# Patient Record
Sex: FEMALE | Race: WHITE | NOT HISPANIC OR LATINO | Employment: PART TIME | URBAN - METROPOLITAN AREA
[De-identification: names, ages, dates, MRNs, and addresses within clinical notes are randomized per-mention and may not be internally consistent; named-entity substitution may affect disease eponyms.]

---

## 2017-01-19 ENCOUNTER — TRANSCRIBE ORDERS (OUTPATIENT)
Dept: PHYSICAL THERAPY | Facility: HOSPITAL | Age: 57
End: 2017-01-19

## 2017-01-19 ENCOUNTER — HOSPITAL ENCOUNTER (OUTPATIENT)
Dept: PHYSICAL THERAPY | Facility: HOSPITAL | Age: 57
Setting detail: THERAPIES SERIES
Discharge: HOME | End: 2017-01-19
Attending: PHYSICAL MEDICINE & REHABILITATION

## 2017-01-19 DIAGNOSIS — M79.18 MYOFASCIAL MUSCLE PAIN: ICD-10-CM

## 2017-01-19 DIAGNOSIS — M54.5 LOW BACK PAIN, UNSPECIFIED BACK PAIN LATERALITY, UNSPECIFIED CHRONICITY, WITH SCIATICA PRESENCE UNSPECIFIED: Primary | ICD-10-CM

## 2017-01-19 DIAGNOSIS — M51.36 DDD (DEGENERATIVE DISC DISEASE), LUMBAR: ICD-10-CM

## 2017-01-19 DIAGNOSIS — M54.16 LUMBAR RADICULOPATHY: Primary | ICD-10-CM

## 2017-01-19 DIAGNOSIS — M79.7 FIBROMYALGIA: ICD-10-CM

## 2017-01-19 PROCEDURE — 97162 PT EVAL MOD COMPLEX 30 MIN: CPT | Performed by: PHYSICAL THERAPIST

## 2017-01-19 PROCEDURE — G8978 MOBILITY CURRENT STATUS: HCPCS | Performed by: PHYSICAL THERAPIST

## 2017-01-19 PROCEDURE — 97110 THERAPEUTIC EXERCISES: CPT | Performed by: PHYSICAL THERAPIST

## 2017-01-19 PROCEDURE — G8979 MOBILITY GOAL STATUS: HCPCS | Performed by: PHYSICAL THERAPIST

## 2017-01-20 NOTE — PROGRESS NOTES
Outpatient Physical Therapy Ortho Initial Evaluation  [facility]     Patient Name: Noni Mayers  : 1960  MRN: [id number]  Today's Date: 2017      Visit Date: 2017    There is no problem list on file for this patient.       Past Medical History   Diagnosis Date   • Arthritis    • Asthma    • Diabetes mellitus    • Fibromyalgia    • Injury of back    • Myofascial muscle pain    • Restless leg syndrome         Past Surgical History   Procedure Laterality Date   • Hysterectomy     • Bladder surgery     • Cholecystectomy     • Colonoscopy     • Oophorectomy         Visit Dx:     ICD-10-CM ICD-9-CM   1. Lumbar radiculopathy M54.16 724.4   2. DDD (degenerative disc disease), lumbar M51.36 722.52   3. Fibromyalgia M79.7 729.1   4. Myofascial muscle pain M79.1 729.1             Patient History       17 1500          History    Chief Complaint Pain;Muscle tenderness  -DM      Type of Pain Back pain;Lower Extremity / Leg  -DM      Date Current Problem(s) Began --   Chronic  -DM      Brief Description of Current Complaint Long history of back pain dating to a fall in  with subsequent surgery in .  She was diagnosed with FMS  5-6 years ago.  She has suffered with back pain with radicular symptoms since that time.  She worsens in the winter months.  She has had aquatic and land PT but was several years ago.    -DM      Previous treatment for THIS PROBLEM Chiropractor;Rehabilitation;Injections;Massage;Surgery;Medication  -DM      Patient/Caregiver Goals Relieve pain;Improve mobility;Improve strength;Know what to do to help the symptoms  -DM      Hand Dominance right-handed  -DM      Occupation/sports/leisure activities Part time care-giver/  -DM      Patient seeing anyone else for problem(s)? Yes  -DM      Pain     Pain Location Back  -DM      Pain at Present 6  -DM      Pain at Best 5  -DM      Pain at Worst 8  -DM      Pain Frequency Constant/continuous   varys in intensity   -DM      Pain Description Aching;Burning;Dull;Sore;Tender;Tightness  -DM      What Performance Factors Make the Current Problem(s) WORSE? All activities, prolonged standing, sitting, walking, bending, cold weather  -DM      What Performance Factors Make the Current Problem(s) BETTER? pain meds, sidelying, change of position  -DM      Tolerance Time- Standing 30 min  -DM      Tolerance Time- Sitting 10 min  -DM      Tolerance Time- Walking 1/4 mile  -DM      Is your sleep disturbed? Yes  -DM      Is medication used to assist with sleep? Yes   lyrica has helped reduce awakenings  -DM      Total hours of sleep per night 5-6  -DM      What position do you sleep in? Right sidelying;Left sidelying  -DM      Difficulties at work? sitting, standing, walking  -DM      Difficulties with ADL's? difficult  -DM      Difficulties with recreational activities? limited in recreational activities.   -DM      Fall Risk Assessment    Any falls in the past year: No  -DM      Daily Activities    Primary Language English  -DM      Are you able to read Yes  -DM      Are you able to write Yes  -DM      How does patient learn best? Listening;Reading;Demonstration;Pictures/Video  -DM      Teaching needs identified Home Exercise Program;Management of Condition  -DM      Does patient have problems with the following? Anxiety  -DM      Barriers to learning None  -DM      Pt Participated in POC and Goals Yes  -DM      Safety    Are you being hurt, hit, or frightened by anyone at home or in your life? No  -DM      Are you being neglected by a caregiver No  -DM        User Key  (r) = Recorded By, (t) = Taken By, (c) = Cosigned By    Initials Name Provider Type    DM Tete Lizama, PT Physical Therapist                PT Ortho       01/19/17 1400    Posture/Observations    Posture/Observations Comments FH,  rounded shoulders, slight increase in kyphosis, decreased lordosis  -DM    Trunk    Flexion AROM --   Decreased 75%  -DM    Extension AROM --    decreased 75%  -DM    Left Lateral Flexion AROM --   Decreased 50%  -DM    Right Lateral Flexion AROM --   Decreased 50%  -DM    Trunk    Trunk Flexion Gross Movement (3-/5) fair minus  -DM    Trunk Extension Gross Movement (3-/5) fair minus  -DM    Left Hip    Hip Flexion Gross Movement (3+/5) fair plus  -DM    Hip Extension Gross Movement (3/5) fair  -DM    Hip ABduction Gross Movement (4-/5) good minus  -DM    Right Hip    Hip Flexion Gross Movement (3+/5) fair plus  -DM    Hip Extension Gross Movement (3/5) fair  -DM    Hip ABduction Gross Movement (4-/5) good minus  -DM    Left Knee    Knee Extension Gross Movement (4-/5) good minus  -DM    Knee Flexion Gross Movement (4-/5) good minus  -DM    Right Knee    Knee Extension Gross Movement (4-/5) good minus  -DM    Knee Flexion Gross Movement (4-/5) good minus  -DM    Left Ankle/Foot    Ankle PF Gross Movement (4-/5) good minus  -DM    Ankle Dorsiflexion Gross Movement (4-/5) good minus  -DM    Right Ankle/Foot    Ankle PF Gross Movement (4-/5) good minus  -DM    Ankle Dorsiflexion Gross Movement (4-/5) good minus  -DM    Flexibility    Flexibility Tested? Lower Extremity  -DM    Lower Extremity Flexibility    Hamstrings Mildly limited  -DM    Hip External Rotators WNL  -DM    Hip Internal Rotators WNL  -DM    Quadratus Lumborum Moderately limited  -DM      User Key  (r) = Recorded By, (t) = Taken By, (c) = Cosigned By    Initials Name Provider Type    DM Tete Lizama PT Physical Therapist                            Therapy Education       01/20/17 0755    Therapy Education    Given HEP  -DM    Program New  -DM    How Provided Verbal;Demonstration;Written  -DM    Provided to Patient  -DM    Level of Understanding Teach back education performed;Verbalized;Demonstrated  -DM      User Key  (r) = Recorded By, (t) = Taken By, (c) = Cosigned By    Initials Name Provider Type    JENNIE Lizama PT Physical Therapist                PT OP Goals       01/20/17 0700           PT Short Term Goals    STG Date to Achieve 02/17/17  -DM      STG 1 Pt will be independent with initial ROM and flexibility program for improved mobility with ADL's  -DM      STG 1 Progress New  -DM      STG 2 Pt will demonstrate improved lumbar ROM by 25%.  -DM      STG 2 Progress New  -DM      STG 3 Pt will report decreased pain to 6/10 at worst with ADL's  -DM      STG 3 Progress New  -DM      STG 4 Pt will demonstrate improved 5x sit to stand from 16.92 seconds to 12 seconds or less demonstrating improved proximal strength.   -DM      STG 4 Progress New  -DM      Long Term Goals    LTG Date to Achieve 03/05/17  -DM      LTG 1 Pt will be independent with lumbar stabilization program for ease with all functional tasks.  -DM      LTG 1 Progress New  -DM      LTG 2 Pt will tolerate standing for longer than 30 minutes without exacerbation of symptoms  -DM      LTG 2 Progress New  -DM      LTG 3 Pt will demonstrate increased trunk and LE strength by 1/2 to 1 grade for stability with functional tasks.  -DM      LTG 3 Progress New  -DM      LTG 4 Pt will report improved function via Oswestry from 70% to 40% disability for improved quality of life.   -DM      LTG 4 Progress New  -DM      Time Calculation    PT Goal Re-Cert Due Date 02/17/17  -DM        User Key  (r) = Recorded By, (t) = Taken By, (c) = Cosigned By    Initials Name Provider Type    JENNIE Lizama, PT Physical Therapist                PT Assessment/Plan       01/20/17 0749          PT Assessment    Functional Limitations Limitation in home management;Limitations in community activities;Limitations in functional capacity and performance;Performance in self-care ADL;Performance in leisure activities;Performance in work activities  -DM      Impairments Pain;Muscle strength;Range of motion;Posture;Poor body mechanics  -DM      Assessment Comments Noni Mayers is a 57 yo female diagnosed with lumbar radiculopathy, lumbar DDD, fibromyalgia,  myofascial muscle pain and is s/p lumbar fusion.  She presents with posture, decreased lumbar ROM, decreased LE and trunk strength more so proximally, diffculty with transitional motions as well as static positions and worsening pain.  Oswestry score was 70% disability.  She will benefit from skilled physical therapy intervention for improved lumbar and proximal stabilization, improved tolerance with static and transitional motions with decreased pain for improved quality of life  -DM      Please refer to paper survey for additional self-reported information Yes  -DM      Rehab Potential Good  -DM      Patient/caregiver participated in establishment of treatment plan and goals Yes  -DM      Patient would benefit from skilled therapy intervention Yes  -DM      PT Plan    PT Frequency 2x/week;3x/week  -DM      Predicted Duration of Therapy Intervention (days/wks) 6 weeks  -DM      Planned CPT's? PT EVAL: 17013;PT NEUROMUSC RE-EDUCATION EA 15 MIN: 26730;PT HOT OR COLD PACK TREAT MCARE;PT ELECTRICAL STIM UNATTEND: ;PT ULTRASOUND EA 15 MIN: 42968;PT THER PROC EA 15 MIN: 44444;PT MANUAL THERAPY EA 15 MIN: 95551  -DM      Physical Therapy Interventions (Optional Details) home exercise program;postural re-education;patient/family education;neuromuscular re-education;modalities;manual therapy techniques;lumbar stabilization;ROM (Range of Motion);strengthening;stretching;swiss ball techniques  -DM      PT Plan Comments Review HEP and progress in ROM and stabilization.  Modalities PRN  -DM        User Key  (r) = Recorded By, (t) = Taken By, (c) = Cosigned By    Initials Name Provider Type    DM Tete Lizama, PT Physical Therapist                  Exercises       01/20/17 0700          Exercise 1    Exercise Name 1 Instructed in LTR, bridges, supine marching with TA, hamstring stretch with strap as outlined on flow sheet   -DM      Cueing 1 Verbal;Tactile  -DM        User Key  (r) = Recorded By, (t) = Taken By, (c) =  Cosigned By    Initials Name Provider Type    DM Tete Lizama, PT Physical Therapist                              Outcome Measures       01/19/17 1400          5 Times Sit to Stand    5 Times Sit to Stand (seconds) 16.92 seconds  -DM      5 Times Sit to Stand Comments used arm rest to push off  -DM      Modified Oswestry    Modified Oswestry Score/Comments 70% disability  -DM      Functional Assessment    Outcome Measure Options 5x Sit to Stand;Modifed Owestry  -DM        User Key  (r) = Recorded By, (t) = Taken By, (c) = Cosigned By    Initials Name Provider Type    JENNIE Lizama, PT Physical Therapist            Time Calculation:   Start Time: 1430  Stop Time: 1515  Time Calculation (min): 45 min     Therapy Charges for Today     Code Description Service Date Service Provider Modifiers Qty    65248349460 HC PT MOBILITY CURRENT 1/19/2017 Tete Lizama, PT GP, CL 1    27878700866 HC PT MOBILITY PROJECTED 1/19/2017 Tete Lizama, PT GP, CK 1    90666181569 HC PT EVAL MOD COMPLEXITY 2 1/19/2017 Tete Lizama, PT GP 1    89794382963 HC PT THER PROC EA 15 MIN 1/19/2017 Tete Lizama, PT GP 1          PT G-Codes  Outcome Measure Options: 5x Sit to Stand, Modifed Owestry  Score: 16.92 seconds; 70% disability  Functional Limitation: Mobility: Walking and moving around  Mobility: Walking and Moving Around Current Status (): At least 60 percent but less than 80 percent impaired, limited or restricted  Mobility: Walking and Moving Around Goal Status (): At least 40 percent but less than 60 percent impaired, limited or restricted         Tete Lizama, PT  1/20/2017

## 2017-01-20 NOTE — PROGRESS NOTES
Allergies   Allergen Reactions   • Penicillins Hives   • Tetracyclines & Related Nausea And Vomiting       Allergies have been reviewed.    Jodi Morse, [facility]

## 2017-01-24 ENCOUNTER — APPOINTMENT (OUTPATIENT)
Dept: PHYSICAL THERAPY | Facility: HOSPITAL | Age: 57
End: 2017-01-24

## 2017-01-26 ENCOUNTER — APPOINTMENT (OUTPATIENT)
Dept: PHYSICAL THERAPY | Facility: HOSPITAL | Age: 57
End: 2017-01-26

## 2017-01-31 ENCOUNTER — HOSPITAL ENCOUNTER (OUTPATIENT)
Dept: PHYSICAL THERAPY | Facility: HOSPITAL | Age: 57
Setting detail: THERAPIES SERIES
End: 2017-01-31

## 2017-02-03 ENCOUNTER — APPOINTMENT (OUTPATIENT)
Dept: PHYSICAL THERAPY | Facility: HOSPITAL | Age: 57
End: 2017-02-03

## 2017-02-07 ENCOUNTER — HOSPITAL ENCOUNTER (OUTPATIENT)
Dept: PHYSICAL THERAPY | Facility: HOSPITAL | Age: 57
Setting detail: THERAPIES SERIES
Discharge: HOME | End: 2017-02-07

## 2017-02-07 DIAGNOSIS — M54.16 LUMBAR RADICULOPATHY: Primary | ICD-10-CM

## 2017-02-07 DIAGNOSIS — M51.36 DDD (DEGENERATIVE DISC DISEASE), LUMBAR: ICD-10-CM

## 2017-02-07 DIAGNOSIS — M79.7 FIBROMYALGIA: ICD-10-CM

## 2017-02-07 DIAGNOSIS — M79.18 MYOFASCIAL MUSCLE PAIN: ICD-10-CM

## 2017-02-07 PROCEDURE — 97110 THERAPEUTIC EXERCISES: CPT | Performed by: PHYSICAL THERAPIST

## 2017-02-07 PROCEDURE — G0283 ELEC STIM OTHER THAN WOUND: HCPCS | Performed by: PHYSICAL THERAPIST

## 2017-02-07 NOTE — PROGRESS NOTES
Outpatient Physical Therapy Ortho Treatment Note  [facility]     Patient Name: Noni Mayers  : 1960  MRN: [id number]  Today's Date: 2017      Visit Date: 2017    Visit Dx:    ICD-10-CM ICD-9-CM   1. Lumbar radiculopathy M54.16 724.4   2. DDD (degenerative disc disease), lumbar M51.36 722.52   3. Fibromyalgia M79.7 729.1   4. Myofascial muscle pain M79.1 729.1       There is no problem list on file for this patient.       Past Medical History   Diagnosis Date   • Arthritis    • Asthma    • Diabetes mellitus    • Fibromyalgia    • Injury of back    • Myofascial muscle pain    • Restless leg syndrome         Past Surgical History   Procedure Laterality Date   • Hysterectomy     • Bladder surgery     • Cholecystectomy     • Colonoscopy     • Oophorectomy                               PT Assessment/Plan       17 0939          PT Assessment    Assessment Comments Noni Mayers has only had 2 appts counting today due to severe GI illness.  She has not been able to do any of her exercise and is has not had any decrease in pain.  Even with pain killers, she reports her pain at 7/10.  She was able to be progressed in exercise today but did have a slight increase in pain with exercises.   -DM      PT Plan    PT Plan Comments Cont with current exericse program with focus on strengthening and stabilization.   -DM        User Key  (r) = Recorded By, (t) = Taken By, (c) = Cosigned By    Initials Name Provider Type    JENNIE Lizama, PT Physical Therapist                Modalities       17 0800          Moist Heat    MH Applied Yes  -DM      Location LB  -DM      Rx Minutes 15 mins  -DM      ELECTRICAL STIMULATION    Attended/Unattended Unattended  -DM      Stimulation Type IFC  -DM      Max mAmp 10.5  -DM      Location/Electrode Placement/Other crossed pattern over lumbar paraspinals.   -DM      Rx Minutes 15 mins  -DM        User Key  (r) = Recorded By, (t) = Taken By, (c) = Cosigned  By    Initials Name Provider Type    JENNIE Lizama, PT Physical Therapist                Exercises       02/07/17 0800          Subjective Comments    Subjective Comments Took pain meds this morning.  Has been very ill and to the ER twice and struggling with high blood sugar ( over 320).   -DM      Subjective Pain    Able to rate subjective pain? yes  -DM      Pre-Treatment Pain Level 7  -DM      Post-Treatment Pain Level 8  -DM      Subjective Pain Comment Without pain meds her pain is up to 9-10/10.   -DM      Exercise 1    Exercise Name 1 Performed previous exercise program with cues for technique.  -DM      Cueing 1 Verbal;Tactile  -DM      Exercise 2    Exercise Name 2 Progressed in strengthening as outlined on flow sheet.  -DM      Cueing 2 Verbal;Tactile;Demo  -DM        User Key  (r) = Recorded By, (t) = Taken By, (c) = Cosigned By    Initials Name Provider Type    JENNIE Lizama, PT Physical Therapist                               PT OP Goals       02/07/17 0800          PT Short Term Goals    STG 1 Pt will be independent with initial ROM and flexibility program for improved mobility with ADL's  -DM      STG 1 Progress Ongoing  -DM      STG 2 Pt will demonstrate improved lumbar ROM by 25%.  -DM      STG 2 Progress Ongoing  -DM      STG 3 Pt will report decreased pain to 6/10 at worst with ADL's  -DM      STG 3 Progress Ongoing  -DM      STG 4 Pt will demonstrate improved 5x sit to stand from 16.92 seconds to 12 seconds or less demonstrating improved proximal strength.   -DM      STG 4 Progress Ongoing  -DM      Long Term Goals    LTG 1 Pt will be independent with lumbar stabilization program for ease with all functional tasks.  -DM      LTG 1 Progress Ongoing  -DM      LTG 2 Pt will tolerate standing for longer than 30 minutes without exacerbation of symptoms  -DM      LTG 2 Progress Ongoing  -DM      LTG 3 Pt will demonstrate increased trunk and LE strength by 1/2 to 1 grade for stability with  functional tasks.  -DM      LTG 3 Progress Ongoing  -DM      LTG 4 Pt will report improved function via Oswestry from 70% to 40% disability for improved quality of life.   -DM      LTG 4 Progress Ongoing  -DM        User Key  (r) = Recorded By, (t) = Taken By, (c) = Cosigned By    Initials Name Provider Type    JENNIE Lizama, PT Physical Therapist                    Time Calculation:   Start Time: 0815  Stop Time: 0900  Time Calculation (min): 45 min    Therapy Charges for Today     Code Description Service Date Service Provider Modifiers Qty    98972600423 HC PT THER PROC EA 15 MIN 2/7/2017 Tete Lizama, PT GP 2    17209563921 HC PT HOT OR COLD PACK TREAT MCARE 2/7/2017 Tete Lizama, PT GP 1    64475966190 HC ELECTRICAL STIM UNATTENDED 2/7/2017 Tete Lizama, PT  1                    Tete Lizama, PT  2/7/2017

## 2017-02-10 ENCOUNTER — APPOINTMENT (OUTPATIENT)
Dept: PHYSICAL THERAPY | Facility: HOSPITAL | Age: 57
End: 2017-02-10

## 2017-02-14 ENCOUNTER — HOSPITAL ENCOUNTER (OUTPATIENT)
Dept: PHYSICAL THERAPY | Facility: HOSPITAL | Age: 57
Setting detail: THERAPIES SERIES
Discharge: HOME | End: 2017-02-14

## 2017-02-14 DIAGNOSIS — M79.18 MYOFASCIAL MUSCLE PAIN: ICD-10-CM

## 2017-02-14 DIAGNOSIS — M51.36 DDD (DEGENERATIVE DISC DISEASE), LUMBAR: ICD-10-CM

## 2017-02-14 DIAGNOSIS — M54.16 LUMBAR RADICULOPATHY: Primary | ICD-10-CM

## 2017-02-14 DIAGNOSIS — M79.7 FIBROMYALGIA: ICD-10-CM

## 2017-02-14 PROCEDURE — G0283 ELEC STIM OTHER THAN WOUND: HCPCS | Performed by: PHYSICAL THERAPIST

## 2017-02-14 PROCEDURE — 97110 THERAPEUTIC EXERCISES: CPT | Performed by: PHYSICAL THERAPIST

## 2017-02-14 NOTE — PROGRESS NOTES
Outpatient Physical Therapy Ortho Treatment Note  [facility]     Patient Name: Noni Mayers  : 1960  MRN: [id number]  Today's Date: 2017      Visit Date: 2017    Visit Dx:    ICD-10-CM ICD-9-CM   1. Lumbar radiculopathy M54.16 724.4   2. DDD (degenerative disc disease), lumbar M51.36 722.52   3. Fibromyalgia M79.7 729.1   4. Myofascial muscle pain M79.1 729.1       There is no problem list on file for this patient.       Past Medical History   Diagnosis Date   • Arthritis    • Asthma    • Diabetes mellitus    • Fibromyalgia    • Injury of back    • Myofascial muscle pain    • Restless leg syndrome         Past Surgical History   Procedure Laterality Date   • Hysterectomy     • Bladder surgery     • Cholecystectomy     • Colonoscopy     • Oophorectomy                               PT Assessment/Plan       17 0804 17 0939       PT Assessment    Assessment Comments She returns today with same level of pain.  She does have increased LBP on opposing side when performing closed chain and hip flexion exercises. She is on her 2nd month of Lyrica and is having less pain..   -DM Noni Mayers has only had 2 appts counting today due to severe GI illness.  She has not been able to do any of her exercise and is has not had any decrease in pain.  Even with pain killers, she reports her pain at 7/10.  She was able to be progressed in exercise today but did have a slight increase in pain with exercises.   -DM     PT Plan    PT Plan Comments cont with exercise program with focus on stabilization.   -DM Cont with current exericse program with focus on strengthening and stabilization.   -DM       User Key  (r) = Recorded By, (t) = Taken By, (c) = Cosigned By    Initials Name Provider Type    JENNIE Lizama, PT Physical Therapist                Modalities       17 0700          Moist Heat    MH Applied Yes  -DM      Location LB  -DM      Rx Minutes 15 mins  -DM      ELECTRICAL  STIMULATION    Attended/Unattended Unattended  -DM      Stimulation Type IFC  -DM      Max mAmp 4.5  -DM      Location/Electrode Placement/Other crossed pattern over lumbar paraspinals.   -DM      Rx Minutes 15 mins  -DM        User Key  (r) = Recorded By, (t) = Taken By, (c) = Cosigned By    Initials Name Provider Type    JENNIE Lizama, PT Physical Therapist                Exercises       02/14/17 0700          Subjective Comments    Subjective Comments A little stiff this morning due to chilllier weather.   -DM      Subjective Pain    Able to rate subjective pain? yes  -DM      Pre-Treatment Pain Level 7  -DM      Exercise 1    Exercise Name 1 Completed exercises as outlined on flow sheet.  Began with Nustep.  -DM      Cueing 1 Verbal;Tactile;Demo  -DM        User Key  (r) = Recorded By, (t) = Taken By, (c) = Cosigned By    Initials Name Provider Type    JENNIE Lizama, PT Physical Therapist                               PT OP Goals       02/07/17 0800          PT Short Term Goals    STG 1 Pt will be independent with initial ROM and flexibility program for improved mobility with ADL's  -DM      STG 1 Progress Ongoing  -DM      STG 2 Pt will demonstrate improved lumbar ROM by 25%.  -DM      STG 2 Progress Ongoing  -DM      STG 3 Pt will report decreased pain to 6/10 at worst with ADL's  -DM      STG 3 Progress Ongoing  -DM      STG 4 Pt will demonstrate improved 5x sit to stand from 16.92 seconds to 12 seconds or less demonstrating improved proximal strength.   -DM      STG 4 Progress Ongoing  -DM      Long Term Goals    LTG 1 Pt will be independent with lumbar stabilization program for ease with all functional tasks.  -DM      LTG 1 Progress Ongoing  -DM      LTG 2 Pt will tolerate standing for longer than 30 minutes without exacerbation of symptoms  -DM      LTG 2 Progress Ongoing  -DM      LTG 3 Pt will demonstrate increased trunk and LE strength by 1/2 to 1 grade for stability with functional tasks.  -DM       LTG 3 Progress Ongoing  -DM      LTG 4 Pt will report improved function via Oswestry from 70% to 40% disability for improved quality of life.   -DM      LTG 4 Progress Ongoing  -DM        User Key  (r) = Recorded By, (t) = Taken By, (c) = Cosigned By    Initials Name Provider Type    DM Tete Lizama, PT Physical Therapist                    Time Calculation:   Start Time: 0730  Stop Time: 0815  Time Calculation (min): 45 min    Therapy Charges for Today     Code Description Service Date Service Provider Modifiers Qty    02883065603 HC PT THER PROC EA 15 MIN 2/14/2017 Tete Lizama, PT GP 2    19993515449 HC PT HOT OR COLD PACK TREAT MCARE 2/14/2017 Tete Lizama, PT GP 1    54595361250 HC ELECTRICAL STIM UNATTENDED 2/14/2017 Tete Lizama, PT  1                    Tete Lizama, PT  2/14/2017

## 2017-02-17 ENCOUNTER — APPOINTMENT (OUTPATIENT)
Dept: PHYSICAL THERAPY | Facility: HOSPITAL | Age: 57
End: 2017-02-17

## 2017-02-21 ENCOUNTER — APPOINTMENT (OUTPATIENT)
Dept: PHYSICAL THERAPY | Facility: HOSPITAL | Age: 57
End: 2017-02-21

## 2017-02-24 ENCOUNTER — APPOINTMENT (OUTPATIENT)
Dept: PHYSICAL THERAPY | Facility: HOSPITAL | Age: 57
End: 2017-02-24

## 2017-03-02 ENCOUNTER — HOSPITAL ENCOUNTER (OUTPATIENT)
Dept: PHYSICAL THERAPY | Facility: HOSPITAL | Age: 57
Setting detail: THERAPIES SERIES
Discharge: HOME | End: 2017-03-02

## 2017-03-02 DIAGNOSIS — M51.36 DDD (DEGENERATIVE DISC DISEASE), LUMBAR: ICD-10-CM

## 2017-03-02 DIAGNOSIS — M79.7 FIBROMYALGIA: ICD-10-CM

## 2017-03-02 DIAGNOSIS — M79.18 MYOFASCIAL MUSCLE PAIN: ICD-10-CM

## 2017-03-02 DIAGNOSIS — M54.16 LUMBAR RADICULOPATHY: Primary | ICD-10-CM

## 2017-03-02 PROCEDURE — 97110 THERAPEUTIC EXERCISES: CPT | Performed by: PHYSICAL THERAPIST

## 2017-03-02 PROCEDURE — G0283 ELEC STIM OTHER THAN WOUND: HCPCS | Performed by: PHYSICAL THERAPIST

## 2017-03-02 NOTE — PROGRESS NOTES
Outpatient Physical Therapy Ortho Re-Assessment  [facility]     Patient Name: Noni Mayers  : 1960  MRN: [id number]  Today's Date: 3/2/2017      Visit Date: 2017    There is no problem list on file for this patient.       Past Medical History   Diagnosis Date   • Arthritis    • Asthma    • Diabetes mellitus    • Fibromyalgia    • Injury of back    • Myofascial muscle pain    • Restless leg syndrome         Past Surgical History   Procedure Laterality Date   • Hysterectomy     • Bladder surgery     • Cholecystectomy     • Colonoscopy     • Oophorectomy         Visit Dx:     ICD-10-CM ICD-9-CM   1. Lumbar radiculopathy M54.16 724.4   2. DDD (degenerative disc disease), lumbar M51.36 722.52   3. Fibromyalgia M79.7 729.1   4. Myofascial muscle pain M79.1 729.1                 PT Ortho       17 0900    Subjective Comments    Subjective Comments Not having to take her pain meds until 10-11, used to wake up at 5 am to take pain meds.   -DM    Subjective Pain    Able to rate subjective pain? yes  -DM    Pre-Treatment Pain Level 7  -DM    Trunk    Flexion AROM --   decreased 25%  -DM    Extension AROM --   decreased 50%  -DM    Left Lateral Flexion AROM --   decreased 50%  -DM    Right Lateral Flexion AROM --   decreased 50%  -DM    Trunk    Trunk Flexion Gross Movement (3/5) fair  -DM    Trunk Extension Gross Movement (3/5) fair  -DM    Left Hip    Hip Flexion Gross Movement (3+/5) fair plus  -DM    Hip Extension Gross Movement (3/5) fair  -DM    Hip ABduction Gross Movement (4-/5) good minus  -DM    Right Hip    Hip Flexion Gross Movement (3+/5) fair plus  -DM    Hip Extension Gross Movement (3/5) fair  -DM    Hip ABduction Gross Movement (4-/5) good minus  -DM    Left Knee    Knee Extension Gross Movement (4-/5) good minus  -DM    Knee Flexion Gross Movement (4-/5) good minus  -DM    Right Knee    Knee Extension Gross Movement (4-/5) good minus  -DM    Knee Flexion Gross Movement (4-/5) good  minus  -DM    Left Ankle/Foot    Ankle PF Gross Movement (4-/5) good minus  -DM    Ankle Dorsiflexion Gross Movement (4-/5) good minus  -DM    Right Ankle/Foot    Ankle PF Gross Movement (4-/5) good minus  -DM    Ankle Dorsiflexion Gross Movement (4-/5) good minus  -DM    Lower Extremity Flexibility    Hamstrings Mildly limited  -DM    Hip External Rotators WNL  -DM    Hip Internal Rotators WNL  -DM    Quadratus Lumborum Moderately limited  -DM      User Key  (r) = Recorded By, (t) = Taken By, (c) = Cosigned By    Initials Name Provider Type    JENNIE Lizama, PT Physical Therapist                                PT OP Goals       03/02/17 1000       PT Short Term Goals    STG 1 Pt will be independent with initial ROM and flexibility program for improved mobility with ADL's  -DM     STG 1 Progress Progressing  -DM     STG 2 Pt will demonstrate improved lumbar ROM by 25%.  -DM     STG 2 Progress Partially Met  -DM     STG 2 Progress Comments For flexion and extension  -DM     STG 3 Pt will report decreased pain to 6/10 at worst with ADL's  -DM     STG 3 Progress Met  -DM     STG 3 Progress Comments At worst is 6/10.  -DM     STG 4 Pt will demonstrate improved 5x sit to stand from 16.92 seconds to 12 seconds or less demonstrating improved proximal strength.   -DM     STG 4 Progress Ongoing  -DM     Long Term Goals    LTG 1 Pt will be independent with lumbar stabilization program for ease with all functional tasks.  -DM     LTG 1 Progress Ongoing  -DM     LTG 2 Pt will tolerate standing for longer than 30 minutes without exacerbation of symptoms  -DM     LTG 2 Progress Ongoing  -DM     LTG 3 Pt will demonstrate increased trunk and LE strength by 1/2 to 1 grade for stability with functional tasks.  -DM     LTG 3 Progress Ongoing  -DM     LTG 4 Pt will report improved function via Oswestry from 70% to 40% disability for improved quality of life.   -DM     LTG 4 Progress Ongoing  -DM       User Key  (r) = Recorded By,  (t) = Taken By, (c) = Cosigned By    Initials Name Provider Type    JENNIE Lizama, PT Physical Therapist                PT Assessment/Plan       03/02/17 1035       PT Assessment    Assessment Comments Kam Mayers has completed 4 sessions, including initial evaluation, since 1/19/17.  She has had various illnesses that have kept her from attending PT on a regular basis.  She is feeling better overall and not waking up requiring pain medication.  She will take pain medications at 10-11 am now.  Her trunk ROM is slightly improved but strength is not improved as of yet.  She will benefit from continued skilled physical therapy on a consistent basis in order to demonstrate improvement in strength.    -DM     Please refer to paper survey for additional self-reported information No  -DM     Rehab Potential Good  -DM     Patient/caregiver participated in establishment of treatment plan and goals Yes  -DM     Patient would benefit from skilled therapy intervention Yes  -DM     PT Plan    PT Frequency 2x/week  -DM     Predicted Duration of Therapy Intervention (days/wks) 4 weeks  -DM     PT Plan Comments Progress in core and LE strengthening/ stabilization.  -DM       User Key  (r) = Recorded By, (t) = Taken By, (c) = Cosigned By    Initials Name Provider Type    JENNIE Lizama, PT Physical Therapist                Modalities       03/02/17 0900          Moist Heat    MH Applied Yes  -DM      Location LB  -DM      Rx Minutes 15 mins  -DM      ELECTRICAL STIMULATION    Attended/Unattended Unattended  -DM      Stimulation Type IFC  -DM      Max mAmp 5.4  -DM      Location/Electrode Placement/Other crossed pattern over lumbar paraspinals.   -DM      Rx Minutes 15 mins  -DM        User Key  (r) = Recorded By, (t) = Taken By, (c) = Cosigned By    Initials Name Provider Type    JENNIE Lizama, PT Physical Therapist              Exercises       03/02/17 0900          Subjective Comments    Subjective Comments Not having  to take her pain meds until 10-11, used to wake up at 5 am to take pain meds.   -DM      Subjective Pain    Able to rate subjective pain? yes  -DM      Pre-Treatment Pain Level 7  -DM      Exercise 1    Exercise Name 1 Completed exercises as outlined on flow sheet.  Began with Nustep.  -DM      Cueing 1 Verbal;Tactile;Demo  -DM        User Key  (r) = Recorded By, (t) = Taken By, (c) = Cosigned By    Initials Name Provider Type    DM Tete Lizama, PT Physical Therapist                              Time Calculation:   Start Time: 0945  Stop Time: 1030  Time Calculation (min): 45 min     Therapy Charges for Today     Code Description Service Date Service Provider Modifiers Qty    20297536748 HC PT THER PROC EA 15 MIN 3/2/2017 Tete Lizama, PT GP 2    31879532357 HC PT HOT OR COLD PACK TREAT MCARE 3/2/2017 Tete Lizama, PT GP 1    72137970137 HC ELECTRICAL STIM UNATTENDED 3/2/2017 Tete Lizama, PT  1                    eTte Lizama, PT  3/2/2017

## 2017-03-03 ENCOUNTER — APPOINTMENT (OUTPATIENT)
Dept: PHYSICAL THERAPY | Facility: HOSPITAL | Age: 57
End: 2017-03-03

## 2017-03-07 ENCOUNTER — APPOINTMENT (OUTPATIENT)
Dept: PHYSICAL THERAPY | Facility: HOSPITAL | Age: 57
End: 2017-03-07

## 2017-03-09 ENCOUNTER — APPOINTMENT (OUTPATIENT)
Dept: PHYSICAL THERAPY | Facility: HOSPITAL | Age: 57
End: 2017-03-09

## 2017-03-14 ENCOUNTER — HOSPITAL ENCOUNTER (OUTPATIENT)
Dept: PHYSICAL THERAPY | Facility: HOSPITAL | Age: 57
Setting detail: THERAPIES SERIES
Discharge: HOME | End: 2017-03-14

## 2017-03-14 DIAGNOSIS — M79.7 FIBROMYALGIA: ICD-10-CM

## 2017-03-14 DIAGNOSIS — M79.18 MYOFASCIAL MUSCLE PAIN: ICD-10-CM

## 2017-03-14 DIAGNOSIS — M54.16 LUMBAR RADICULOPATHY: Primary | ICD-10-CM

## 2017-03-14 DIAGNOSIS — M51.36 DDD (DEGENERATIVE DISC DISEASE), LUMBAR: ICD-10-CM

## 2017-03-14 PROCEDURE — 97110 THERAPEUTIC EXERCISES: CPT | Performed by: PHYSICAL THERAPIST

## 2017-03-14 PROCEDURE — G0283 ELEC STIM OTHER THAN WOUND: HCPCS | Performed by: PHYSICAL THERAPIST

## 2017-03-14 NOTE — PROGRESS NOTES
Outpatient Physical Therapy Ortho Treatment Note  [facility]     Patient Name: Noni Mayers  : 1960  MRN: [id number]  Today's Date: 3/14/2017      Visit Date: 2017    Visit Dx:    ICD-10-CM ICD-9-CM   1. Lumbar radiculopathy M54.16 724.4   2. DDD (degenerative disc disease), lumbar M51.36 722.52   3. Fibromyalgia M79.7 729.1   4. Myofascial muscle pain M79.1 729.1       There is no problem list on file for this patient.       Past Medical History   Diagnosis Date   • Arthritis    • Asthma    • Diabetes mellitus    • Fibromyalgia    • Injury of back    • Myofascial muscle pain    • Restless leg syndrome         Past Surgical History   Procedure Laterality Date   • Hysterectomy     • Bladder surgery     • Cholecystectomy     • Colonoscopy     • Oophorectomy               PT Ortho       17 0800    Subjective Comments    Subjective Comments Having some more pain yesterday and this morning.   -DM      User Key  (r) = Recorded By, (t) = Taken By, (c) = Cosigned By    Initials Name Provider Type    JENNIE Lizama, PT Physical Therapist                            PT Assessment/Plan       17 1016       PT Assessment    Assessment Comments Increased pain due to change in weather.  Her technique with exercises has improved.  She will be ready to progress in lumbar stabilization.   -DM     PT Plan    PT Plan Comments  progress in lumbar stabilization.   -DM       User Key  (r) = Recorded By, (t) = Taken By, (c) = Cosigned By    Initials Name Provider Type    JENNIE Lizama, PT Physical Therapist                Modalities       17 0800          Moist Heat    MH Applied Yes  -DM      Location LB  -DM      Rx Minutes 15 mins  -DM      ELECTRICAL STIMULATION    Attended/Unattended Unattended  -DM      Stimulation Type IFC  -DM      Max mAmp 5  -DM      Location/Electrode Placement/Other crossed pattern over lumbar paraspinals.   -DM      Rx Minutes 15 mins  -DM        User Key  (r) =  Recorded By, (t) = Taken By, (c) = Cosigned By    Initials Name Provider Type    DM Tete Lizama, PT Physical Therapist                Exercises       03/14/17 0800          Subjective Comments    Subjective Comments Having some more pain yesterday and this morning.   -DM      Subjective Pain    Able to rate subjective pain? yes  -DM      Pre-Treatment Pain Level 8  -DM      Subjective Pain Comment The Estim and heat   -DM      Exercise 1    Exercise Name 1 Completed exercises as outlined on flow sheet.  Began with Nustep.  -DM      Cueing 1 Verbal;Tactile;Demo  -DM        User Key  (r) = Recorded By, (t) = Taken By, (c) = Cosigned By    Initials Name Provider Type    JENNIE Lizama, PT Physical Therapist                                       Time Calculation:   Start Time: 0815  Stop Time: 0900  Time Calculation (min): 45 min    Therapy Charges for Today     Code Description Service Date Service Provider Modifiers Qty    23150384896 HC PT THER PROC EA 15 MIN 3/14/2017 Tete Lizama, PT GP 2    67864287555 HC PT HOT OR COLD PACK TREAT MCARE 3/14/2017 Tete Lizama, PT GP 1    74623380756 HC ELECTRICAL STIM UNATTENDED 3/14/2017 Tete Lizama, PT  1                    Tete Lizama, PT  3/14/2017

## 2017-03-16 ENCOUNTER — HOSPITAL ENCOUNTER (OUTPATIENT)
Dept: PHYSICAL THERAPY | Facility: HOSPITAL | Age: 57
Setting detail: THERAPIES SERIES
Discharge: HOME | End: 2017-03-16

## 2017-03-16 DIAGNOSIS — M79.18 MYOFASCIAL MUSCLE PAIN: ICD-10-CM

## 2017-03-16 DIAGNOSIS — M79.7 FIBROMYALGIA: ICD-10-CM

## 2017-03-16 DIAGNOSIS — M51.36 DDD (DEGENERATIVE DISC DISEASE), LUMBAR: ICD-10-CM

## 2017-03-16 DIAGNOSIS — M54.16 LUMBAR RADICULOPATHY: Primary | ICD-10-CM

## 2017-03-16 PROCEDURE — G0283 ELEC STIM OTHER THAN WOUND: HCPCS | Performed by: PHYSICAL THERAPIST

## 2017-03-16 PROCEDURE — 97110 THERAPEUTIC EXERCISES: CPT | Performed by: PHYSICAL THERAPIST

## 2017-03-16 NOTE — PROGRESS NOTES
Outpatient Physical Therapy Ortho Treatment Note  [facility]     Patient Name: Noni Mayers  : 1960  MRN: [id number]  Today's Date: 3/16/2017      Visit Date: 2017    Visit Dx:    ICD-10-CM ICD-9-CM   1. Lumbar radiculopathy M54.16 724.4   2. DDD (degenerative disc disease), lumbar M51.36 722.52   3. Fibromyalgia M79.7 729.1   4. Myofascial muscle pain M79.1 729.1       There is no problem list on file for this patient.       Past Medical History   Diagnosis Date   • Arthritis    • Asthma    • Diabetes mellitus    • Fibromyalgia    • Injury of back    • Myofascial muscle pain    • Restless leg syndrome         Past Surgical History   Procedure Laterality Date   • Hysterectomy     • Bladder surgery     • Cholecystectomy     • Colonoscopy     • Oophorectomy               PT Ortho       17 0800    Subjective Comments    Subjective Comments Having some more pain yesterday and this morning.   -DM      User Key  (r) = Recorded By, (t) = Taken By, (c) = Cosigned By    Initials Name Provider Type    JENNIE Lizama, PT Physical Therapist                            PT Assessment/Plan       17 0936       PT Assessment    Assessment Comments Decreased pain level today and improved tolerance with all exercises.   -DM     PT Plan    PT Plan Comments cont with strengthening program.   -DM       User Key  (r) = Recorded By, (t) = Taken By, (c) = Cosigned By    Initials Name Provider Type    JENNIE Lizama, PT Physical Therapist                Modalities       17 0900          Moist Heat    MH Applied Yes  -DM      Location LB  -DM      Rx Minutes 15 mins  -DM      ELECTRICAL STIMULATION    Attended/Unattended Unattended  -DM      Stimulation Type IFC  -DM      Max mAmp 4.9  -DM      Location/Electrode Placement/Other crossed pattern over lumbar paraspinals.   -DM      Rx Minutes 15 mins  -DM        User Key  (r) = Recorded By, (t) = Taken By, (c) = Cosigned By    Initials Name Provider  Type    DM Tete Lizama, PT Physical Therapist                Exercises       03/16/17 0900          Subjective Comments    Subjective Comments Feeling a little better.  Still has to take hydrocodone in the mornings.  -DM      Subjective Pain    Able to rate subjective pain? yes  -DM      Pre-Treatment Pain Level 6  -DM      Post-Treatment Pain Level 5  -DM      Exercise 1    Exercise Name 1 Completed exercises as outlined on flow sheet.  Began with Nustep.  -DM      Cueing 1 Verbal;Tactile;Demo  -DM        User Key  (r) = Recorded By, (t) = Taken By, (c) = Cosigned By    Initials Name Provider Type    DM Tete Lizama, PT Physical Therapist                                       Time Calculation:   Start Time: 0900  Stop Time: 0945  Time Calculation (min): 45 min    Therapy Charges for Today     Code Description Service Date Service Provider Modifiers Qty    15879877466 HC PT THER PROC EA 15 MIN 3/16/2017 Tete Lizama, PT GP 2    50485403982 HC PT HOT OR COLD PACK TREAT MCARE 3/16/2017 Tete Lizama, PT GP 1    16372782785 HC ELECTRICAL STIM UNATTENDED 3/16/2017 Tete Lizama, PT  1                    Tete Lizama, PT  3/16/2017

## 2017-03-24 ENCOUNTER — APPOINTMENT (OUTPATIENT)
Dept: PHYSICAL THERAPY | Facility: HOSPITAL | Age: 57
End: 2017-03-24

## 2017-03-28 ENCOUNTER — HOSPITAL ENCOUNTER (OUTPATIENT)
Dept: PHYSICAL THERAPY | Facility: HOSPITAL | Age: 57
Setting detail: THERAPIES SERIES
Discharge: HOME | End: 2017-03-28

## 2017-03-28 DIAGNOSIS — M79.7 FIBROMYALGIA: ICD-10-CM

## 2017-03-28 DIAGNOSIS — M54.16 LUMBAR RADICULOPATHY: Primary | ICD-10-CM

## 2017-03-28 DIAGNOSIS — M79.18 MYOFASCIAL MUSCLE PAIN: ICD-10-CM

## 2017-03-28 DIAGNOSIS — M51.36 DDD (DEGENERATIVE DISC DISEASE), LUMBAR: ICD-10-CM

## 2017-03-28 PROCEDURE — G0283 ELEC STIM OTHER THAN WOUND: HCPCS | Performed by: PHYSICAL THERAPIST

## 2017-03-28 PROCEDURE — 97110 THERAPEUTIC EXERCISES: CPT | Performed by: PHYSICAL THERAPIST

## 2017-03-28 NOTE — PROGRESS NOTES
Outpatient Physical Therapy Ortho Treatment Note  [facility]     Patient Name: Noni Mayers  : 1960  MRN: [id number]  Today's Date: 3/28/2017      Visit Date: 2017    Visit Dx:    ICD-10-CM ICD-9-CM   1. Lumbar radiculopathy M54.16 724.4   2. DDD (degenerative disc disease), lumbar M51.36 722.52   3. Fibromyalgia M79.7 729.1   4. Myofascial muscle pain M79.1 729.1       There is no problem list on file for this patient.       Past Medical History:   Diagnosis Date   • Arthritis    • Asthma    • Diabetes mellitus    • Fibromyalgia    • Injury of back    • Myofascial muscle pain    • Restless leg syndrome         Past Surgical History:   Procedure Laterality Date   • BLADDER SURGERY     • CHOLECYSTECTOMY     • COLONOSCOPY     • HYSTERECTOMY     • OOPHORECTOMY                               PT Assessment/Plan       17 0950       PT Assessment    Assessment Comments Despite worse pain this morning, she was able to progress in strengthening without increased pain. She is moving better on and off the table and gait is smoother.  -DM     PT Plan    PT Plan Comments Assess effect of new strengthening exercises.   -DM       User Key  (r) = Recorded By, (t) = Taken By, (c) = Cosigned By    Initials Name Provider Type    JENNIE Lizama, PT Physical Therapist                Modalities       17 0800          Subjective Pain    Post-Treatment Pain Level 4  -DM      Moist Heat    MH Applied Yes  -DM      Location LB  -DM      Rx Minutes 15 mins  -DM      ELECTRICAL STIMULATION    Attended/Unattended Unattended  -DM      Stimulation Type IFC  -DM      Max mAmp 6.4  -DM      Location/Electrode Placement/Other crossed pattern over lumbar paraspinals.   -DM      Rx Minutes 15 mins  -DM        User Key  (r) = Recorded By, (t) = Taken By, (c) = Cosigned By    Initials Name Provider Type    JENNIE Lizama, PT Physical Therapist                Exercises       17 0800          Subjective  Comments    Subjective Comments Painful night. Tried 3 different beds and could not get comfortable.  Came to [facility] at 5:30 and swam in the therapy pool, used the hot tub and took a shower.  Feeling a little better now.   -DM      Subjective Pain    Able to rate subjective pain? yes  -DM      Pre-Treatment Pain Level 6  -DM      Post-Treatment Pain Level 4  -DM      Exercise 1    Exercise Name 1 Progressed to exercises on equipment today.  She had more difficulty with UE exercises compared to LE.  -DM      Cueing 1 Verbal;Tactile;Demo  -DM        User Key  (r) = Recorded By, (t) = Taken By, (c) = Cosigned By    Initials Name Provider Type    JENNIE Lizama, PT Physical Therapist                               PT OP Goals       03/28/17 0900       PT Short Term Goals    STG 1 Pt will be independent with initial ROM and flexibility program for improved mobility with ADL's  -DM     STG 1 Progress Met  -DM     STG 2 Pt will demonstrate improved lumbar ROM by 25%.  -DM     STG 2 Progress Met  -DM     STG 3 Pt will report decreased pain to 6/10 at worst with ADL's  -DM     STG 3 Progress Met  -DM     STG 4 Pt will demonstrate improved 5x sit to stand from 16.92 seconds to 12 seconds or less demonstrating improved proximal strength.   -DM     STG 4 Progress Ongoing  -DM     Long Term Goals    LTG 1 Pt will be independent with lumbar stabilization program for ease with all functional tasks.  -DM     LTG 1 Progress Ongoing  -DM     LTG 2 Pt will tolerate standing for longer than 30 minutes without exacerbation of symptoms  -DM     LTG 2 Progress Ongoing  -DM     LTG 3 Pt will demonstrate increased trunk and LE strength by 1/2 to 1 grade for stability with functional tasks.  -DM     LTG 3 Progress Ongoing  -DM     LTG 4 Pt will report improved function via Oswestry from 70% to 40% disability for improved quality of life.   -DM     LTG 4 Progress Ongoing  -DM       User Key  (r) = Recorded By, (t) = Taken By, (c) =  Cosigned By    Initials Name Provider Type    DM Tete Lizama, PT Physical Therapist                    Time Calculation:   Start Time: 0730  Stop Time: 0815  Time Calculation (min): 45 min    Therapy Charges for Today     Code Description Service Date Service Provider Modifiers Qty    13154842669 HC PT THER PROC EA 15 MIN 3/28/2017 Tete Lizama, PT GP 2    80788729829 HC PT HOT OR COLD PACK TREAT MCARE 3/28/2017 Tete Lizama, PT GP 1    04013477414 HC ELECTRICAL STIM UNATTENDED 3/28/2017 Tete Lizama, PT  1                    Tete Lizama, PT  3/28/2017

## 2017-03-31 ENCOUNTER — HOSPITAL ENCOUNTER (OUTPATIENT)
Dept: PHYSICAL THERAPY | Facility: HOSPITAL | Age: 57
Setting detail: THERAPIES SERIES
Discharge: HOME | End: 2017-03-31

## 2017-03-31 PROCEDURE — 97035 APP MDLTY 1+ULTRASOUND EA 15: CPT | Performed by: PHYSICAL THERAPIST

## 2017-03-31 PROCEDURE — G0283 ELEC STIM OTHER THAN WOUND: HCPCS | Performed by: PHYSICAL THERAPIST

## 2017-04-04 ENCOUNTER — HOSPITAL ENCOUNTER (OUTPATIENT)
Dept: PHYSICAL THERAPY | Facility: HOSPITAL | Age: 57
Setting detail: THERAPIES SERIES
Discharge: HOME | End: 2017-04-04

## 2017-04-04 PROCEDURE — 97110 THERAPEUTIC EXERCISES: CPT | Performed by: PHYSICAL THERAPIST

## 2017-04-04 PROCEDURE — G0283 ELEC STIM OTHER THAN WOUND: HCPCS | Performed by: PHYSICAL THERAPIST

## 2017-04-04 NOTE — PROGRESS NOTES
Outpatient Physical Therapy Ortho Treatment Note  [facility]     Patient Name: Noni Mayers  : 1960  MRN: [id number]  Today's Date: 2017      Visit Date: 2017    Visit Dx:  No diagnosis found.    There is no problem list on file for this patient.       Past Medical History:   Diagnosis Date   • Arthritis    • Asthma    • Diabetes mellitus    • Fibromyalgia    • Injury of back    • Myofascial muscle pain    • Restless leg syndrome         Past Surgical History:   Procedure Laterality Date   • BLADDER SURGERY     • CHOLECYSTECTOMY     • COLONOSCOPY     • HYSTERECTOMY     • OOPHORECTOMY               PT Ortho       17 0800    Subjective Comments    Subjective Comments Still painful.  The rainy/stormy weather makes her pain worse.    -DM    Subjective Pain    Able to rate subjective pain? yes  -DM    Pre-Treatment Pain Level 7  -DM    Post-Treatment Pain Level 5  -DM    Subjective Pain Comment Sees Dr. Morris on Friday.   -DM      User Key  (r) = Recorded By, (t) = Taken By, (c) = Cosigned By    Initials Name Provider Type    JENNIE Lizama, PT Physical Therapist                            PT Assessment/Plan       17 08       PT Assessment    Assessment Comments Continuing to experience a Fibro flare up today but was encouraged to do exercise and stretching to help ease some of the Fibro irritation.   -DM       User Key  (r) = Recorded By, (t) = Taken By, (c) = Cosigned By    Initials Name Provider Type    JENNIE Lizama, PT Physical Therapist                    Exercises       17 08          Subjective Comments    Subjective Comments Still painful.  The rainy/stormy weather makes her pain worse.    -DM      Subjective Pain    Able to rate subjective pain? yes  -DM      Pre-Treatment Pain Level 7  -DM      Post-Treatment Pain Level 5  -DM      Subjective Pain Comment Sees Dr. Morris on Friday.   -DM      Exercise 1    Exercise Name 1 She struggled with exercises  today and was only able to perform 10 reps on equipment. Completed exercises as outlined on flow sheet.    -DM      Cueing 1 Verbal;Tactile;Demo  -DM        User Key  (r) = Recorded By, (t) = Taken By, (c) = Cosigned By    Initials Name Provider Type    JENNIE Lizama PT Physical Therapist                               PT OP Goals       04/04/17 0800       PT Short Term Goals    STG 1 Pt will be independent with initial ROM and flexibility program for improved mobility with ADL's  -DM     STG 1 Progress Met  -DM     STG 2 Pt will demonstrate improved lumbar ROM by 25%.  -DM     STG 2 Progress Met  -DM     STG 3 Pt will report decreased pain to 6/10 at worst with ADL's  -DM     STG 3 Progress Met  -DM     STG 4 Pt will demonstrate improved 5x sit to stand from 16.92 seconds to 12 seconds or less demonstrating improved proximal strength.   -DM     Long Term Goals    LTG 1 Pt will be independent with lumbar stabilization program for ease with all functional tasks.  -DM     LTG 1 Progress Progressing  -DM     LTG 2 Pt will tolerate standing for longer than 30 minutes without exacerbation of symptoms  -DM     LTG 2 Progress Ongoing  -DM     LTG 3 Pt will demonstrate increased trunk and LE strength by 1/2 to 1 grade for stability with functional tasks.  -DM     LTG 4 Pt will report improved function via Oswestry from 70% to 40% disability for improved quality of life.   -DM     LTG 4 Progress Ongoing  -DM       User Key  (r) = Recorded By, (t) = Taken By, (c) = Cosigned By    Initials Name Provider Type    JENNIE Lizama PT Physical Therapist                    Time Calculation:   Start Time: 0730  Stop Time: 0815  Time Calculation (min): 45 min    Therapy Charges for Today     Code Description Service Date Service Provider Modifiers Qty    90732431326 HC PT THER PROC EA 15 MIN 4/4/2017 Tete Lizama PT GP 2    86788341886 HC PT HOT OR COLD PACK TREAT MCARE 4/4/2017 Tete Lizama, PT GP 1    99897766920 HC PT  ELECTRICAL STIM UNATTENDED 4/4/2017 Tete Lizama, PT  1                    Tete Lizama, PT  4/4/2017

## 2017-04-06 ENCOUNTER — APPOINTMENT (OUTPATIENT)
Dept: PHYSICAL THERAPY | Facility: HOSPITAL | Age: 57
End: 2017-04-06

## 2017-04-20 ENCOUNTER — HOSPITAL ENCOUNTER (OUTPATIENT)
Dept: PHYSICAL THERAPY | Facility: HOSPITAL | Age: 57
Setting detail: THERAPIES SERIES
Discharge: HOME | End: 2017-04-20

## 2017-04-20 DIAGNOSIS — M79.18 MYOFASCIAL MUSCLE PAIN: ICD-10-CM

## 2017-04-20 DIAGNOSIS — M79.7 FIBROMYALGIA: ICD-10-CM

## 2017-04-20 DIAGNOSIS — M51.36 DDD (DEGENERATIVE DISC DISEASE), LUMBAR: ICD-10-CM

## 2017-04-20 DIAGNOSIS — M54.16 LUMBAR RADICULOPATHY: Primary | ICD-10-CM

## 2017-04-20 PROCEDURE — G8978 MOBILITY CURRENT STATUS: HCPCS | Performed by: PHYSICAL THERAPIST

## 2017-04-20 PROCEDURE — G0283 ELEC STIM OTHER THAN WOUND: HCPCS | Performed by: PHYSICAL THERAPIST

## 2017-04-20 PROCEDURE — G8979 MOBILITY GOAL STATUS: HCPCS | Performed by: PHYSICAL THERAPIST

## 2017-04-20 PROCEDURE — 97110 THERAPEUTIC EXERCISES: CPT | Performed by: PHYSICAL THERAPIST

## 2017-04-20 NOTE — PROGRESS NOTES
Outpatient Physical Therapy Ortho Re-Assessment  [facility]     Patient Name: Noni Mayers  : 1960  MRN: [id number]  Today's Date: 2017      Visit Date: 2017    There is no problem list on file for this patient.       Past Medical History:   Diagnosis Date   • Arthritis    • Asthma    • Diabetes mellitus    • Fibromyalgia    • Injury of back    • Myofascial muscle pain    • Restless leg syndrome         Past Surgical History:   Procedure Laterality Date   • BLADDER SURGERY     • CHOLECYSTECTOMY     • COLONOSCOPY     • HYSTERECTOMY     • OOPHORECTOMY         Visit Dx:     ICD-10-CM ICD-9-CM   1. Lumbar radiculopathy M54.16 724.4   2. DDD (degenerative disc disease), lumbar M51.36 722.52   3. Fibromyalgia M79.7 729.1   4. Myofascial muscle pain M79.1 729.1                 PT Ortho       17 0800    Subjective Comments    Subjective Comments Feeling a little stiff this morning.    -DM    Subjective Pain    Able to rate subjective pain? yes  -DM    Pre-Treatment Pain Level 6  -DM    Trunk    Flexion AROM --   Decreased 25%  -DM    Extension AROM --   Decreased 50%  -DM    Left Lateral Flexion AROM --   Decreased 25%  -DM    Right Lateral Flexion AROM --   Decreased 25%  -DM    Trunk    Trunk Flexion Gross Movement (3+/5) fair plus  -DM    Trunk Extension Gross Movement (3/5) fair  -DM    Left Hip    Hip Flexion Gross Movement (4-/5) good minus  -DM    Hip Extension Gross Movement (4-/5) good minus  -DM    Hip ABduction Gross Movement (4-/5) good minus  -DM    Right Hip    Hip Flexion Gross Movement (4-/5) good minus  -DM    Hip Extension Gross Movement (4-/5) good minus  -DM    Hip ABduction Gross Movement (4-/5) good minus  -DM    Left Knee    Knee Extension Gross Movement (4-/5) good minus  -DM    Knee Flexion Gross Movement (4-/5) good minus  -DM    Right Knee    Knee Extension Gross Movement (4-/5) good minus  -DM    Knee Flexion Gross Movement (4-/5) good minus  -DM    Left  Ankle/Foot    Ankle PF Gross Movement (4-/5) good minus  -DM    Ankle Dorsiflexion Gross Movement (4-/5) good minus  -DM    Right Ankle/Foot    Ankle PF Gross Movement (4-/5) good minus  -DM    Ankle Dorsiflexion Gross Movement (4-/5) good minus  -DM      User Key  (r) = Recorded By, (t) = Taken By, (c) = Cosigned By    Initials Name Provider Type    JENNIE Lizama, PT Physical Therapist                                PT OP Goals       04/20/17 0700       PT Short Term Goals    STG 1 Pt will be independent with initial ROM and flexibility program for improved mobility with ADL's  -DM     STG 1 Progress Met  -DM     STG 2 Pt will demonstrate improved lumbar ROM by 25%.  -DM     STG 2 Progress Met  -DM     STG 3 Pt will report decreased pain to 6/10 at worst with ADL's  -DM     STG 3 Progress Met  -DM     STG 4 Pt will demonstrate improved 5x sit to stand from 16.92 seconds to 12 seconds or less demonstrating improved proximal strength.   -DM     STG 4 Progress Progressing  -DM     STG 4 Progress Comments 13.78  -DM     Long Term Goals    LTG 1 Pt will be independent with lumbar stabilization program for ease with all functional tasks.  -DM     LTG 1 Progress Progressing  -DM     LTG 1 Progress Comments Being progressed on strengthening equipment.   -DM     LTG 2 Pt will tolerate standing for longer than 30 minutes without exacerbation of symptoms  -DM     LTG 2 Progress Progressing  -DM     LTG 2 Progress Comments tolerating longer periods of time standing to perform tasks but still not longer than 30 minutes.  -DM     LTG 3 Pt will demonstrate increased trunk and LE strength by 1/2 to 1 grade for stability with functional tasks.  -DM     LTG 3 Progress Partially Met  -DM     LTG 4 Pt will report improved function via Oswestry from 70% to 40% disability for improved quality of life.   -DM     LTG 4 Progress Progressing  -DM     LTG 4 Progress Comments Improved to 60%  -DM       User Key  (r) = Recorded By, (t) =  Taken By, (c) = Cosigned By    Initials Name Provider Type    JENNIE Lizama, PT Physical Therapist                PT Assessment/Plan       04/20/17 1415       PT Assessment    Impairments Impaired ventilation;Integumentary integrity  -DM     Assessment Comments Noni Mayers has completed 10 sessions of physical therapy and cancelled 5 sessions since 1/19/17.  She is slowly improving in strength and endurance but continues to experience pain flare ups.  Her greatest improvement in strength is in her trunk and hips.  Her flexiblity is also improved and she has incorporated exercise for flexibility into her daily life.  She reports improved function via Oswestry from 70% disability initially to 60%.  She will benefit from 2 more sessions of physical therapy to firm up her HEP and her program on equipment.    -DM     Please refer to paper survey for additional self-reported information Yes  -DM     Rehab Potential Good  -DM     Patient/caregiver participated in establishment of treatment plan and goals Yes  -DM     Patient would benefit from skilled therapy intervention Yes  -DM     PT Plan    Predicted Duration of Therapy Intervention (days/wks) 2 more sessions  -DM     PT Plan Comments S ee 2 more times for exerciseSee  -DM       User Key  (r) = Recorded By, (t) = Taken By, (c) = Cosigned By    Initials Name Provider Type    JENNIE Lizama, PT Physical Therapist                Modalities       04/20/17 0800          Moist Heat    MH Applied Yes  -DM      Location LB  -DM      Rx Minutes 15 mins  -DM      ELECTRICAL STIMULATION    Attended/Unattended Unattended  -DM      Stimulation Type IFC  -DM      Max mAmp 7.4  -DM      Location/Electrode Placement/Other crossed pattern over lumbar paraspinals.   -DM      Rx Minutes 15 mins  -DM        User Key  (r) = Recorded By, (t) = Taken By, (c) = Cosigned By    Initials Name Provider Type    JENNIE Lizama, PT Physical Therapist              Exercises        04/20/17 0800          Subjective Comments    Subjective Comments Feeling a little stiff this morning.    -DM      Subjective Pain    Able to rate subjective pain? yes  -DM      Pre-Treatment Pain Level 6  -DM      Exercise 1    Exercise Name 1 Completed exercises as outlined on flow sheet.  Began with Nustep.  -DM      Cueing 1 Verbal;Tactile;Demo  -DM      Exercise 2    Exercise Name 2 Progressed on exercise equipment as outlined on flow sheet.  -DM      Cueing 2 Verbal;Tactile;Demo  -DM        User Key  (r) = Recorded By, (t) = Taken By, (c) = Cosigned By    Initials Name Provider Type    JENNIE Lizama, PT Physical Therapist                              Outcome Measures       04/20/17 1400          5 Times Sit to Stand    5 Times Sit to Stand (seconds) 13.78 seconds  -DM      5 Times Sit to Stand Comments pushed off of thighs  -DM      Modified Oswestry    Modified Oswestry Score/Comments 60% disability  -DM      Functional Assessment    Outcome Measure Options 5x Sit to Stand;Modifed Owestry  -DM        User Key  (r) = Recorded By, (t) = Taken By, (c) = Cosigned By    Initials Name Provider Type    JENNIE Lizama, FACUNDO Physical Therapist            Time Calculation:   Start Time: 0730  Stop Time: 0815  Time Calculation (min): 45 min     Therapy Charges for Today     Code Description Service Date Service Provider Modifiers Qty    66229854987 HC PT MOBILITY CURRENT 4/20/2017 Tete Lizama, PT GP, CL 1    76215404305 HC PT MOBILITY PROJECTED 4/20/2017 Tete Lizama, PT GP, CK 1    80392507783 HC PT THER PROC EA 15 MIN 4/20/2017 Tete Lizama, PT GP 2    41435882847 HC PT HOT OR COLD PACK TREAT MCARE 4/20/2017 Ttee Lizama, PT GP 1    10812080049 HC PT ELECTRICAL STIM UNATTENDED 4/20/2017 Tete Lizama, PT  1          PT G-Codes  Outcome Measure Options: 5x Sit to Stand, Modifed Owestry  Score: 13.78 seconds; 60%  Mobility: Walking and Moving Around Current Status (): At least 60 percent but less than 80  percent impaired, limited or restricted  Mobility: Walking and Moving Around Goal Status (): At least 40 percent but less than 60 percent impaired, limited or restricted         Tete Lizama, PT  4/20/2017

## 2017-05-05 ENCOUNTER — HOSPITAL ENCOUNTER (OUTPATIENT)
Dept: PHYSICAL THERAPY | Facility: HOSPITAL | Age: 57
Setting detail: THERAPIES SERIES
Discharge: HOME | End: 2017-05-05

## 2017-05-05 DIAGNOSIS — M79.18 MYOFASCIAL MUSCLE PAIN: ICD-10-CM

## 2017-05-05 DIAGNOSIS — M51.36 DDD (DEGENERATIVE DISC DISEASE), LUMBAR: ICD-10-CM

## 2017-05-05 DIAGNOSIS — M54.16 LUMBAR RADICULOPATHY: Primary | ICD-10-CM

## 2017-05-05 DIAGNOSIS — M79.7 FIBROMYALGIA: ICD-10-CM

## 2017-05-05 PROCEDURE — 97110 THERAPEUTIC EXERCISES: CPT | Performed by: PHYSICAL THERAPIST

## 2017-05-05 PROCEDURE — G0283 ELEC STIM OTHER THAN WOUND: HCPCS | Performed by: PHYSICAL THERAPIST

## 2017-07-06 ENCOUNTER — DOCUMENTATION (OUTPATIENT)
Dept: PHYSICAL THERAPY | Facility: HOSPITAL | Age: 57
End: 2017-07-06

## 2017-07-06 NOTE — THERAPY DISCHARGE NOTE
Outpatient Physical Therapy Discharge Summary         Patient Name: Noni Mayers  : 1960  MRN: [id number]    Today's Date: 2017    Visit Dx:  No diagnosis found.          PT OP Goals       17 1500       PT Short Term Goals    STG 1 Pt will be independent with initial ROM and flexibility program for improved mobility with ADL's  -DM     STG 1 Progress Met  -DM     STG 2 Pt will demonstrate improved lumbar ROM by 25%.  -DM     STG 2 Progress Met  -DM     STG 3 Pt will report decreased pain to 6/10 at worst with ADL's  -DM     STG 3 Progress Met  -DM     STG 4 Pt will demonstrate improved 5x sit to stand from 16.92 seconds to 12 seconds or less demonstrating improved proximal strength.   -DM     STG 4 Progress Not Met  -DM     Long Term Goals    LTG 1 Pt will be independent with lumbar stabilization program for ease with all functional tasks.  -DM     LTG 1 Progress Partially Met  -DM     LTG 2 Pt will tolerate standing for longer than 30 minutes without exacerbation of symptoms  -DM     LTG 2 Progress Not Met  -DM     LTG 3 Pt will demonstrate increased trunk and LE strength by 1/2 to 1 grade for stability with functional tasks.  -DM     LTG 3 Progress Partially Met  -DM     LTG 4 Pt will report improved function via Oswestry from 70% to 40% disability for improved quality of life.   -DM     LTG 4 Progress Not Met  -DM       User Key  (r) = Recorded By, (t) = Taken By, (c) = Cosigned By    Initials Name Provider Type    JENNIE Lizama, PT Physical Therapist          OP PT Discharge Summary  Date of Discharge: 17  Reason for Discharge: At baseline function  Outcomes Achieved: Patient able to partially acheive established goals  Discharge Destination: Home with home program  Discharge Instructions: Noni Mayers completed 11 sessions of physical therapy for lumbar radicular pain, DDD, fibromyalgia and myofascial pain.  She had several setbacks during PT with severe illness including  pancreatitis.  She continued to have good and bad days.  Overall she was doing better and could do all her exercises without increased pain.  She was due to return for one more session but did not return.  She is discharged at this time.       Time Calculation:                    Tete Lizama, PT  7/6/2017

## 2017-11-16 ENCOUNTER — HOSPITAL ENCOUNTER (OUTPATIENT)
Dept: GENERAL RADIOLOGY | Facility: HOSPITAL | Age: 57
Discharge: HOME | End: 2017-11-16
Admitting: OBSTETRICS & GYNECOLOGY

## 2017-11-16 ENCOUNTER — TRANSCRIBE ORDERS (OUTPATIENT)
Dept: ADMINISTRATIVE | Facility: HOSPITAL | Age: 57
End: 2017-11-16

## 2017-11-16 DIAGNOSIS — M25.511 RIGHT SHOULDER PAIN, UNSPECIFIED CHRONICITY: ICD-10-CM

## 2017-11-16 DIAGNOSIS — M25.511 RIGHT SHOULDER PAIN, UNSPECIFIED CHRONICITY: Primary | ICD-10-CM

## 2017-11-16 PROCEDURE — 73030 X-RAY EXAM OF SHOULDER: CPT

## 2018-03-12 ENCOUNTER — LAB REQUISITION (OUTPATIENT)
Dept: LAB | Facility: OTHER | Age: 58
End: 2018-03-12

## 2018-03-12 DIAGNOSIS — Z20.5 EXPOSURE TO HEPATITIS A: ICD-10-CM

## 2018-03-12 LAB
ALBUMIN SERPL-MCNC: 4.9 G/DL (ref 3.5–5.2)
ALBUMIN/GLOB SERPL: 1.9 G/DL
ALP SERPL-CCNC: 74 U/L (ref 39–117)
ALT SERPL W P-5'-P-CCNC: 18 U/L (ref 1–33)
ANION GAP SERPL CALCULATED.3IONS-SCNC: 13.4 MMOL/L
AST SERPL-CCNC: 17 U/L (ref 1–32)
BASOPHILS # BLD AUTO: 0.04 10*3/MM3 (ref 0–0.2)
BASOPHILS NFR BLD AUTO: 0.6 % (ref 0–1.5)
BILIRUB SERPL-MCNC: 0.4 MG/DL (ref 0.1–1.2)
BUN BLD-MCNC: 17 MG/DL (ref 6–20)
BUN/CREAT SERPL: 20 (ref 7–25)
CALCIUM SPEC-SCNC: 9.9 MG/DL (ref 8.6–10.5)
CHLORIDE SERPL-SCNC: 98 MMOL/L (ref 98–107)
CO2 SERPL-SCNC: 27.6 MMOL/L (ref 22–29)
CREAT BLD-MCNC: 0.85 MG/DL (ref 0.57–1)
DEPRECATED RDW RBC AUTO: 42.4 FL (ref 37–54)
EOSINOPHIL # BLD AUTO: 0.3 10*3/MM3 (ref 0–0.7)
EOSINOPHIL NFR BLD AUTO: 4.6 % (ref 0.3–6.2)
ERYTHROCYTE [DISTWIDTH] IN BLOOD BY AUTOMATED COUNT: 12.1 % (ref 11.7–13)
GFR SERPL CREATININE-BSD FRML MDRD: 69 ML/MIN/1.73
GLOBULIN UR ELPH-MCNC: 2.6 GM/DL
GLUCOSE BLD-MCNC: 189 MG/DL (ref 65–99)
HCT VFR BLD AUTO: 39.6 % (ref 35.6–45.5)
HGB BLD-MCNC: 13.1 G/DL (ref 11.9–15.5)
IMM GRANULOCYTES # BLD: 0.03 10*3/MM3 (ref 0–0.03)
IMM GRANULOCYTES NFR BLD: 0.5 % (ref 0–0.5)
LYMPHOCYTES # BLD AUTO: 2.71 10*3/MM3 (ref 0.9–4.8)
LYMPHOCYTES NFR BLD AUTO: 41.4 % (ref 19.6–45.3)
MCH RBC QN AUTO: 31.6 PG (ref 26.9–32)
MCHC RBC AUTO-ENTMCNC: 33.1 G/DL (ref 32.4–36.3)
MCV RBC AUTO: 95.7 FL (ref 80.5–98.2)
MONOCYTES # BLD AUTO: 0.25 10*3/MM3 (ref 0.2–1.2)
MONOCYTES NFR BLD AUTO: 3.8 % (ref 5–12)
NEUTROPHILS # BLD AUTO: 3.22 10*3/MM3 (ref 1.9–8.1)
NEUTROPHILS NFR BLD AUTO: 49.1 % (ref 42.7–76)
PLATELET # BLD AUTO: 279 10*3/MM3 (ref 140–500)
PMV BLD AUTO: 12 FL (ref 6–12)
POTASSIUM BLD-SCNC: 4.4 MMOL/L (ref 3.5–5.2)
PROT SERPL-MCNC: 7.5 G/DL (ref 6–8.5)
RBC # BLD AUTO: 4.14 10*6/MM3 (ref 3.9–5.2)
SODIUM BLD-SCNC: 139 MMOL/L (ref 136–145)
WBC NRBC COR # BLD: 6.55 10*3/MM3 (ref 4.5–10.7)

## 2018-03-12 PROCEDURE — 85025 COMPLETE CBC W/AUTO DIFF WBC: CPT | Performed by: PHYSICAL MEDICINE & REHABILITATION

## 2018-03-12 PROCEDURE — 86708 HEPATITIS A ANTIBODY: CPT | Performed by: PHYSICAL MEDICINE & REHABILITATION

## 2018-03-12 PROCEDURE — 80053 COMPREHEN METABOLIC PANEL: CPT | Performed by: PHYSICAL MEDICINE & REHABILITATION

## 2018-03-12 PROCEDURE — 86709 HEPATITIS A IGM ANTIBODY: CPT | Performed by: PHYSICAL MEDICINE & REHABILITATION

## 2018-03-13 LAB — HAV AB SER QL IA: POSITIVE

## 2018-03-15 LAB
HAV IGM SERPL QL IA: NORMAL
SPECIMEN STATUS: NORMAL

## 2019-07-11 ENCOUNTER — TRANSCRIBE ORDERS (OUTPATIENT)
Dept: PHYSICAL THERAPY | Facility: HOSPITAL | Age: 59
End: 2019-07-11

## 2019-07-11 DIAGNOSIS — M54.40 BILATERAL LOW BACK PAIN WITH SCIATICA, SCIATICA LATERALITY UNSPECIFIED, UNSPECIFIED CHRONICITY: Primary | ICD-10-CM

## 2019-07-15 ENCOUNTER — APPOINTMENT (OUTPATIENT)
Dept: PHYSICAL THERAPY | Facility: HOSPITAL | Age: 59
End: 2019-07-15

## 2019-07-22 ENCOUNTER — APPOINTMENT (OUTPATIENT)
Dept: PHYSICAL THERAPY | Facility: HOSPITAL | Age: 59
End: 2019-07-22

## 2019-07-29 ENCOUNTER — APPOINTMENT (OUTPATIENT)
Dept: PHYSICAL THERAPY | Facility: HOSPITAL | Age: 59
End: 2019-07-29

## 2019-07-31 ENCOUNTER — HOSPITAL ENCOUNTER (OUTPATIENT)
Dept: PHYSICAL THERAPY | Facility: HOSPITAL | Age: 59
Setting detail: THERAPIES SERIES
Discharge: HOME | End: 2019-07-31

## 2019-07-31 DIAGNOSIS — G89.29 CHRONIC BILATERAL LOW BACK PAIN WITH BILATERAL SCIATICA: Primary | ICD-10-CM

## 2019-07-31 DIAGNOSIS — M54.42 CHRONIC BILATERAL LOW BACK PAIN WITH BILATERAL SCIATICA: Primary | ICD-10-CM

## 2019-07-31 DIAGNOSIS — M54.41 CHRONIC BILATERAL LOW BACK PAIN WITH BILATERAL SCIATICA: Primary | ICD-10-CM

## 2019-07-31 PROCEDURE — 97110 THERAPEUTIC EXERCISES: CPT

## 2019-07-31 PROCEDURE — 97162 PT EVAL MOD COMPLEX 30 MIN: CPT

## 2019-08-06 ENCOUNTER — HOSPITAL ENCOUNTER (OUTPATIENT)
Dept: PHYSICAL THERAPY | Facility: HOSPITAL | Age: 59
Setting detail: THERAPIES SERIES
Discharge: HOME | End: 2019-08-06

## 2019-08-06 DIAGNOSIS — M54.42 CHRONIC BILATERAL LOW BACK PAIN WITH BILATERAL SCIATICA: Primary | ICD-10-CM

## 2019-08-06 DIAGNOSIS — M54.41 CHRONIC BILATERAL LOW BACK PAIN WITH BILATERAL SCIATICA: Primary | ICD-10-CM

## 2019-08-06 DIAGNOSIS — G89.29 CHRONIC BILATERAL LOW BACK PAIN WITH BILATERAL SCIATICA: Primary | ICD-10-CM

## 2019-08-06 NOTE — THERAPY TREATMENT NOTE
Outpatient Physical Therapy Ortho Treatment Note  [facility]     Patient Name: Noni Mayers  : 1960  MRN: [id number]  Today's Date: 2019      Visit Date: 2019    Visit Dx:    ICD-10-CM ICD-9-CM   1. Chronic bilateral low back pain with bilateral sciatica M54.42 724.2    M54.41 724.3    G89.29 338.29       There is no problem list on file for this patient.       Past Medical History:   Diagnosis Date   • Arthritis    • Asthma    • Depression    • Diabetes mellitus (CMS/HCC)    • Fibromyalgia    • Injury of back    • Lichen sclerosus    • Myofascial muscle pain    • Restless leg syndrome         Past Surgical History:   Procedure Laterality Date   • BLADDER SURGERY     • CHOLECYSTECTOMY     • COLONOSCOPY     • HYSTERECTOMY     • OOPHORECTOMY                         PT Assessment/Plan     Row Name 19 1422          PT Assessment    Assessment Comments  Patient was seen for initial aquatic session today including education, decompression, water walking, LE stretching, and basic core/LE exercises.  She denied any increased pain during session and required cuing for posture, core activation, and correct form/technique with ther ex.  -RA        PT Plan    PT Plan Comments  Assess response to initial aquatic session and modify/progress ex/activity per patient tolerance  -RA       User Key  (r) = Recorded By, (t) = Taken By, (c) = Cosigned By    Initials Name Provider Type    Mansi De León, PT Physical Therapist            Exercises     Row Name 19 0900             Subjective Comments    Subjective Comments  Reports she is a member here and tries to do water ex when she feels up to it.  Sees spine surgeon on the  and will likely end up having sx.  -RA         Subjective Pain    Able to rate subjective pain?  yes  -RA      Pre-Treatment Pain Level  7  -RA      Subjective Pain Comment  spasms in back with pain going down legs  -RA         Aquatics    Aquatics performed?  Yes   -RA      83922 - Aquatic Therapy Minutes  44  -RA         Aquatics LE    Water Walk  forward;side 3 ea, attempted bwd - stopped due to pain.  -RA      Stretch 1  HS 20 sec x 3 B  -RA      Stretch 2  DKTC at wall 20 sec x 2  -RA      Stretch 3  piriformis stretch 20 sec x 2 B  -RA      Stretch Other 1  short lever hip sweep, SN x10 B  -RA      Vertical Traction  3-5 min x 2  -RA      Abdominals  noodle SN x 15  -RA      Hip Abd/Add  12 B  -RA      March in Place  15  -RA      Uni-Clock  hip circles cw/ccw 10/10  -RA      Bicycle  3 min, LN deep end  -RA         Aquatics UE    Exercise 3  seated at TE2 x 5 min  -RA        User Key  (r) = Recorded By, (t) = Taken By, (c) = Cosigned By    Initials Name Provider Type    Mansi De León, PT Physical Therapist                           Therapy Education  Education Details: importance of posture and core activation with all ex/activity  Given: Pain management, Posture/body mechanics, Symptoms/condition management  Program: Reinforced, New(initiated aquatic ex)  How Provided: Verbal, Demonstration  Provided to: Patient  Level of Understanding: Teach back education performed, Verbalized              Time Calculation:   Start Time: 0902  Stop Time: 0946  Time Calculation (min): 44 min                Mansi Rudd, PT  8/6/2019

## 2019-08-15 ENCOUNTER — HOSPITAL ENCOUNTER (OUTPATIENT)
Dept: PHYSICAL THERAPY | Facility: HOSPITAL | Age: 59
Setting detail: THERAPIES SERIES
Discharge: HOME | End: 2019-08-15

## 2019-08-15 DIAGNOSIS — G89.29 CHRONIC BILATERAL LOW BACK PAIN WITH BILATERAL SCIATICA: Primary | ICD-10-CM

## 2019-08-15 DIAGNOSIS — M54.41 CHRONIC BILATERAL LOW BACK PAIN WITH BILATERAL SCIATICA: Primary | ICD-10-CM

## 2019-08-15 DIAGNOSIS — M54.42 CHRONIC BILATERAL LOW BACK PAIN WITH BILATERAL SCIATICA: Primary | ICD-10-CM

## 2019-08-15 PROCEDURE — 97113 AQUATIC THERAPY/EXERCISES: CPT | Performed by: PHYSICAL THERAPIST

## 2019-08-15 NOTE — THERAPY TREATMENT NOTE
Outpatient Physical Therapy Ortho Treatment Note  [facility]     Patient Name: Noni Mayers  : 1960  MRN: [id number]  Today's Date: 8/15/2019      Visit Date: 08/15/2019    Visit Dx:    ICD-10-CM ICD-9-CM   1. Chronic bilateral low back pain with bilateral sciatica M54.42 724.2    M54.41 724.3    G89.29 338.29       There is no problem list on file for this patient.       Past Medical History:   Diagnosis Date   • Arthritis    • Asthma    • Depression    • Diabetes mellitus (CMS/HCC)    • Fibromyalgia    • Injury of back    • Lichen sclerosus    • Myofascial muscle pain    • Restless leg syndrome         Past Surgical History:   Procedure Laterality Date   • BLADDER SURGERY     • CHOLECYSTECTOMY     • COLONOSCOPY     • HYSTERECTOMY     • OOPHORECTOMY                         PT Assessment/Plan     Row Name 08/15/19 0971          PT Assessment    Assessment Comments  Noni reports increased pain after first aquatic session. In addition she reports she needs back surgery, however cannot be completed for 3 months due to high triglycerides. Modified today’s session.   -JACQUELINE       User Key  (r) = Recorded By, (t) = Taken By, (c) = Cosigned By    Initials Name Provider Type    Brandon Arias, PT Physical Therapist            Exercises     Row Name 08/15/19 0900             Subjective Comments    Subjective Comments  Needs surgery but has to wait until triglycerides lower.   -JACQUELINE         Subjective Pain    Able to rate subjective pain?  yes  -JACQUELINE      Pre-Treatment Pain Level  6  -JACQUELINE      Post-Treatment Pain Level  6  -JACQUELINE      Subjective Pain Comment  Noni reported increased pain (6.5/10) after therapy and states this usually happens after any type of increased actvity.   -JACQUELINE         Aquatics    Aquatics performed?  Yes  -JACQUELINE      61580 - Aquatic Therapy Minutes  43  -JACQUELINE         Aquatics LE    Water Walk  forward;side;backward 3 laps F and S, one lap backwards  -JACQUELINE      Stretch 1  Calf 20 sec X 2   -JACQUELINE      Stretch 2  --  -JACQUELINE      Stretch 3  LAQs X 15 Ea  -JACQUELINE      Stretch Other 1  Shldr Abd/ Add w/ small foam dumbbells X 10  -JACQUELINE      Vertical Traction  3-5 min x 2  -JACQUELINE      Abdominals  noodle LN 2 X 10  -JACQUELINE      Hip Abd/Add  12 B  -JACQUELINE      March in Place  15X   -JACQUELINE      Toe/Heel Raises  -/20X   -JACQUELINE      Uni-Clock  Hip circles cw/ccw 12/12  -JACQUELINE      Bicycle  3 min, LN deep end  -JACQUELINE      Flutter/Scissor  -/15X seated   -JACQUELINE      Exercise 1  TuckUps X 12  -JACQUELINE      Exercise 2  Rows X 20,  L5 paddles  -JACQUELINE      Exercise 3  Paddle Stirs L5 10/10  -JACQUELINE        User Key  (r) = Recorded By, (t) = Taken By, (c) = Cosigned By    Initials Name Provider Type    Brandon Arias, PT Physical Therapist                                          Time Calculation:   Start Time: 0945  Stop Time: 1030  Time Calculation (min): 45 min  Total Timed Code Minutes- PT: 43 minute(s)  Therapy Charges for Today     Code Description Service Date Service Provider Modifiers Qty    07528949715 HC PT AQUATIC THERAPY EA 15 MIN 8/15/2019 Brandon Milner, PT GP 3                    Brandon Milner, PT  8/15/2019

## 2019-08-19 ENCOUNTER — APPOINTMENT (OUTPATIENT)
Dept: PHYSICAL THERAPY | Facility: HOSPITAL | Age: 59
End: 2019-08-19

## 2019-08-21 ENCOUNTER — HOSPITAL ENCOUNTER (OUTPATIENT)
Dept: PHYSICAL THERAPY | Facility: HOSPITAL | Age: 59
Setting detail: THERAPIES SERIES
Discharge: HOME | End: 2019-08-21

## 2019-08-21 DIAGNOSIS — M54.42 CHRONIC BILATERAL LOW BACK PAIN WITH BILATERAL SCIATICA: Primary | ICD-10-CM

## 2019-08-21 DIAGNOSIS — G89.29 CHRONIC BILATERAL LOW BACK PAIN WITH BILATERAL SCIATICA: Primary | ICD-10-CM

## 2019-08-21 DIAGNOSIS — M54.41 CHRONIC BILATERAL LOW BACK PAIN WITH BILATERAL SCIATICA: Primary | ICD-10-CM

## 2019-08-21 PROCEDURE — 97113 AQUATIC THERAPY/EXERCISES: CPT | Performed by: PHYSICAL THERAPIST

## 2019-08-21 NOTE — THERAPY TREATMENT NOTE
Outpatient Physical Therapy Ortho Treatment Note  [facility]     Patient Name: Noni Mayers  : 1960  MRN: [id number]  Today's Date: 2019      Visit Date: 2019    Visit Dx:    ICD-10-CM ICD-9-CM   1. Chronic bilateral low back pain with bilateral sciatica M54.42 724.2    M54.41 724.3    G89.29 338.29       There is no problem list on file for this patient.       Past Medical History:   Diagnosis Date   • Arthritis    • Asthma    • Depression    • Diabetes mellitus (CMS/HCC)    • Fibromyalgia    • Injury of back    • Lichen sclerosus    • Myofascial muscle pain    • Restless leg syndrome         Past Surgical History:   Procedure Laterality Date   • BLADDER SURGERY     • CHOLECYSTECTOMY     • COLONOSCOPY     • HYSTERECTOMY     • OOPHORECTOMY                         PT Assessment/Plan     Row Name 19 1152          PT Assessment    Assessment Comments  Noni reports temporary relief in water but states that her pain returns by the time she changes clothes in the locker room. She has switched to low carb to better manage her A1C. Provided postural cueing for standing exercises to avoid lateral trunk lean. Avoided wall walk stretch since it appeared to exaccerbate pain.   -KH       User Key  (r) = Recorded By, (t) = Taken By, (c) = Cosigned By    Initials Name Provider Type    Susan Coffman, PT Physical Therapist            Exercises     Row Name 19 1100             Subjective Comments    Subjective Comments  I had bad sugar levels and HTN monday but it‘s better now  -KH         Subjective Pain    Able to rate subjective pain?  yes  -ELIANA      Pre-Treatment Pain Level  7  -KH      Post-Treatment Pain Level  6  -KH         Aquatics    Aquatics performed?  Yes  -ELIANA      29093 - Aquatic Therapy Minutes  40  -KH         Aquatics LE    Water Walk  forward;side;backward x3 forward/side. 1 back  -KH      Stretch 1  Calf 20 sec X 2  -KH      Stretch 3  LAQs X 15 Ea  -KH      Stretch  Other 1  Shldr Abd/ Add w/ small foam dumbbells X 10  -KH      Vertical Traction  3-5 min x 2  -KH      Abdominals  noodle LN 2x10  -KH      Hip Abd/Add  12 B  -KH      March in Place  15X   -KH      Toe/Heel Raises  -/20X   -KH      Uni-Clock  Hip circles cw/ccw 12/12  -KH      Bicycle  3 min, LN deep end  -KH      Flutter/Scissor  -/15X seated   -KH      Exercise 1  TuckUps X 12  -KH      Exercise 2  Rows X 20,  L5 paddles  -KH      Exercise 3  Paddle Stirs L5 10/10  -KH        User Key  (r) = Recorded By, (t) = Taken By, (c) = Cosigned By    Initials Name Provider Type    Susan Coffman, PT Physical Therapist                                          Time Calculation:   Start Time: 1130  Stop Time: 1210  Time Calculation (min): 40 min  Therapy Charges for Today     Code Description Service Date Service Provider Modifiers Qty    87997606759 HC PT AQUATIC THERAPY EA 15 MIN 8/21/2019 Susan Iverson, PT  3                    Susan Iverson, FACUNDO  8/21/2019

## 2019-08-28 ENCOUNTER — APPOINTMENT (OUTPATIENT)
Dept: PHYSICAL THERAPY | Facility: HOSPITAL | Age: 59
End: 2019-08-28

## 2019-08-29 ENCOUNTER — HOSPITAL ENCOUNTER (OUTPATIENT)
Dept: PHYSICAL THERAPY | Facility: HOSPITAL | Age: 59
Setting detail: THERAPIES SERIES
Discharge: HOME | End: 2019-08-29

## 2019-08-29 DIAGNOSIS — G89.29 CHRONIC BILATERAL LOW BACK PAIN WITH BILATERAL SCIATICA: Primary | ICD-10-CM

## 2019-08-29 DIAGNOSIS — M54.42 CHRONIC BILATERAL LOW BACK PAIN WITH BILATERAL SCIATICA: Primary | ICD-10-CM

## 2019-08-29 DIAGNOSIS — M54.41 CHRONIC BILATERAL LOW BACK PAIN WITH BILATERAL SCIATICA: Primary | ICD-10-CM

## 2019-08-29 PROCEDURE — 97113 AQUATIC THERAPY/EXERCISES: CPT | Performed by: PHYSICAL THERAPIST

## 2019-08-29 NOTE — THERAPY PROGRESS REPORT/RE-CERT
Outpatient Physical Therapy Ortho Re-Assessment  [facility]     Patient Name: Noni Mayers  : 1960  MRN: [id number]  Today's Date: 2019      Visit Date: 2019    There is no problem list on file for this patient.       Past Medical History:   Diagnosis Date   • Arthritis    • Asthma    • Depression    • Diabetes mellitus (CMS/HCC)    • Fibromyalgia    • Injury of back    • Lichen sclerosus    • Myofascial muscle pain    • Restless leg syndrome         Past Surgical History:   Procedure Laterality Date   • BLADDER SURGERY     • CHOLECYSTECTOMY     • COLONOSCOPY     • HYSTERECTOMY     • OOPHORECTOMY         Visit Dx:     ICD-10-CM ICD-9-CM   1. Chronic bilateral low back pain with bilateral sciatica M54.42 724.2    M54.41 724.3    G89.29 338.29                             Therapy Education  Education Details: lengthy discussion regarding fear avoidance and exercise  Given: Pain management, Posture/body mechanics, Symptoms/condition management  Program: Reinforced, New  How Provided: Verbal, Demonstration  Provided to: Patient  Level of Understanding: Teach back education performed, Verbalized     PT OP Goals     Row Name 19 0900          PT Short Term Goals    STG Date to Achieve  19  -LC     STG 1  Patient will be independent with education for symptom management and initial HEP to decrease LBP pain.  -LC     STG 1 Progress  Progressing  -LC     STG 2  Pt will tolerate one full aqua PT session with no increased pain for symptom management.   -LC     STG 2 Progress  Met  -LC     STG 2 Progress Comments  pain reduced to 5/10  -     STG 3  Pt will improve lumbar ROM to WFL with </=5/10 pain in all cardinal planes for improved mobility and participation in ADLs.  -     STG 3 Progress  Progressing  -        Long Term Goals    LTG Date to Achieve  19  -     LTG 1  Patient will be independent with education for symptom management and advanced HEP to decrease LBP pain.   -LC     LTG 1 Progress  Ongoing  -LC     LTG 2  Patient will reduce level of percieved disability as measured by the Modified Oswestry  from 68% disability to </= 58% disability in order to improve quality of life.  -LC     LTG 2 Progress  Ongoing  -LC     LTG 3  Patient will improve ability to sleep through the night without sleep disturbances related to back pain to improve overall sleep quality and quality of life  -LC     LTG 3 Progress  Ongoing  -LC     LTG 4  Patient will improve sitting tolerance from 10 min to >30 min without LBP to improve participation in community activities.  -     LTG 4 Progress  Ongoing  -LC       User Key  (r) = Recorded By, (t) = Taken By, (c) = Cosigned By    Initials Name Provider Type    Paulino Campuzano, PT DPT Physical Therapist          PT Assessment/Plan     Row Name 08/29/19 1008          PT Assessment    Functional Limitations  Limitation in home management;Limitations in community activities;Limitations in functional capacity and performance;Performance in self-care ADL;Performance in leisure activities;Performance in work activities  -     Impairments  Impaired ventilation;Integumentary integrity  -     Assessment Comments  long discussion with pt regarding fear avoidance beliefs and exercise. pt reports that exercise makes her back worse. she also states she is having upcoming spinal surgery. reviewed importance of increasing core strength and general mobility to alleviate low back pain. scaled exercises to accomodate high level of pain reported by pt today. pt reported pain to 5/10 following skilled therapy.  -        PT Plan    PT Plan Comments  attempt to progress core per pt pain tolerance  -       User Key  (r) = Recorded By, (t) = Taken By, (c) = Cosigned By    Initials Name Provider Type    Paulino Campuzano, PT DPT Physical Therapist            Exercises     Row Name 08/29/19 0900             Subjective Comments    Subjective Comments  pt states that  exercise makes her back pain worse. she has upcoming back surgery and she doesnt really know why she is doing therapy at this time as it continues to cause her back pain.  -LC         Subjective Pain    Able to rate subjective pain?  yes  -LC      Pre-Treatment Pain Level  8  -LC         Aquatics    Aquatics performed?  Yes  -LC      18774 - Aquatic Therapy Minutes  45  -LC         Aquatics LE    Water Walk  forward;side;backward x3 forward/side. 1 back  -LC      Stretch 1  15x HS sweep SN  -LC      Stretch 2  5x5s piriformis stretch  -LC      Stretch 3  5x5s side plank with kickboard standing at wll  -LC      Stretch Other 1  10x each sktc on LN  -LC      Vertical Traction  --  -LC      Abdominals  --  -LC      Clams  10x ea 1 at a alma standing at wall  -LC      Hip Abd/Add  --  -LC      Hip Flex/Ext  10x LN suspend  -LC      March in Place  --  -LC      Toe/Heel Raises  --  -LC      Uni-Clock  10x aguaring press down  -LC      Bicycle  20x LN supported  -LC      Flutter/Scissor  20x LN supported  -LC      Exercise 1  15x single leg tuckups  -LC      Exercise 2  --  -LC      Exercise 3  --  -LC        User Key  (r) = Recorded By, (t) = Taken By, (c) = Cosigned By    Initials Name Provider Type    LC Paulino Paul, PT DPT Physical Therapist                           Modified Oswestry  Modified Oswestry Score/Comments: 70%      Time Calculation:     Start Time: 0900  Stop Time: 0945  Time Calculation (min): 45 min  Total Timed Code Minutes- PT: 45 minute(s)     Therapy Charges for Today     Code Description Service Date Service Provider Modifiers Qty    51068753928 HC PT AQUATIC THERAPY EA 15 MIN 8/29/2019 Paulino Paul, PT DPT GP 3          PT G-Codes  Modified Oswestry Score/Comments: 70%         FACUNDO LeoneT  8/29/2019

## 2019-09-03 ENCOUNTER — OFFICE VISIT (OUTPATIENT)
Dept: PHYSICAL THERAPY | Facility: CLINIC | Age: 59
End: 2019-09-03

## 2019-09-03 DIAGNOSIS — M54.42 CHRONIC BILATERAL LOW BACK PAIN WITH BILATERAL SCIATICA: Primary | ICD-10-CM

## 2019-09-03 DIAGNOSIS — G89.29 CHRONIC BILATERAL LOW BACK PAIN WITH BILATERAL SCIATICA: Primary | ICD-10-CM

## 2019-09-03 DIAGNOSIS — M54.41 CHRONIC BILATERAL LOW BACK PAIN WITH BILATERAL SCIATICA: Primary | ICD-10-CM

## 2019-09-03 PROCEDURE — 97113 AQUATIC THERAPY/EXERCISES: CPT | Performed by: PHYSICAL THERAPIST

## 2019-09-03 NOTE — PROGRESS NOTES
Physical Therapy Daily Progress Note    Patient: Noni Mayers   : 1960  Diagnosis/ICD-10 Code:  Chronic bilateral low back pain with bilateral sciatica [M54.42, M54.41, G89.29]  Referring practitioner: MARY ANN Galvan  Date of Initial Visit: Type: THERAPY  Noted: 2019  Today's Date: 9/3/2019  Patient seen for 6 sessions             Subjective Evaluation    History of Present Illness    Subjective comment: pt reports she had no increased pain during weekend trip. she states back felt tood after last sessionPain  Current pain ratin           Objective   See Exercise, Manual, and Modality Logs for complete treatment.       Assessment & Plan     Assessment  Impairments: activity intolerance, impaired physical strength, lacks appropriate home exercise program and pain with function  Assessment details: Pt progressed with increased repetitions of therex only. She stated at last appt that therapy causes increased so therapist is trying to manage workload while trying to strengthen core. She again reported no pain increase with therapy today. Attempting to avoid low back extension movements as this exacerbates low back pain. Added paddle core stab today with no pain complaints.  Functional Limitations: carrying objects, lifting, sleeping, walking, uncomfortable because of pain and standing  Plan  Plan details: Continue to attempt to progress with core stability per tolerance.        Progress per Plan of Care and Progress strengthening /stabilization /functional activity           Timed:  Aquatic Therapy    45     mins [id number];    Paulino Paul, PT DPT  Physical Therapist

## 2020-03-20 ENCOUNTER — DOCUMENTATION (OUTPATIENT)
Dept: PHYSICAL THERAPY | Facility: CLINIC | Age: 60
End: 2020-03-20

## 2020-03-20 DIAGNOSIS — M54.41 CHRONIC BILATERAL LOW BACK PAIN WITH BILATERAL SCIATICA: Primary | ICD-10-CM

## 2020-03-20 DIAGNOSIS — M54.42 CHRONIC BILATERAL LOW BACK PAIN WITH BILATERAL SCIATICA: Primary | ICD-10-CM

## 2020-03-20 DIAGNOSIS — G89.29 CHRONIC BILATERAL LOW BACK PAIN WITH BILATERAL SCIATICA: Primary | ICD-10-CM

## 2020-03-20 NOTE — PROGRESS NOTES
Discharge Summary  Discharge Summary from Physical Therapy Report      Noni Mayers was seen for 6 physical therapy sessions for chronic low back pain with B sciatica.  Treatment included aquatic therapy and patient education with home exercise program . Progress to physical therapy goals was fair.  She was discharged to an independent HEP and provided patient education to self-manage condition.      PT Short Term Goals      STG Date to Achieve  08/30/19  -       STG 1  Patient will be independent with education for symptom management and initial HEP to decrease LBP pain.  -       STG 1 Progress  Progressing  -       STG 2  Pt will tolerate one full aqua PT session with no increased pain for symptom management.   -LC       STG 2 Progress  Met  -LC       STG 2 Progress Comments  pain reduced to 5/10  -LC       STG 3  Pt will improve lumbar ROM to WFL with </=5/10 pain in all cardinal planes for improved mobility and participation in ADLs.  -       STG 3 Progress  Progressing  -                  Long Term Goals     LTG Date to Achieve  09/29/19  -       LTG 1  Patient will be independent with education for symptom management and advanced HEP to decrease LBP pain.  -       LTG 1 Progress  Ongoing  -       LTG 2  Patient will reduce level of percieved disability as measured by the Modified Oswestry  from 68% disability to </= 58% disability in order to improve quality of life.  -       LTG 2 Progress  Ongoing  -       LTG 3  Patient will improve ability to sleep through the night without sleep disturbances related to back pain to improve overall sleep quality and quality of life  -       LTG 3 Progress  Ongoing  -       LTG 4  Patient will improve sitting tolerance from 10 min to >30 min without LBP to improve participation in community activities.  -       LTG 4 Progress  Ongoing  -         Discharge Plan: Continue with current home exercise program as instructed    Date of Last Physical  Therapy Visit September 3, 2020        Jalen Foley, PT  Physical Therapist

## 2020-12-09 ENCOUNTER — DOCUMENTATION (OUTPATIENT)
Dept: PHYSICAL THERAPY | Facility: CLINIC | Age: 60
End: 2020-12-09